# Patient Record
Sex: MALE | Race: OTHER | HISPANIC OR LATINO | Employment: OTHER | URBAN - METROPOLITAN AREA
[De-identification: names, ages, dates, MRNs, and addresses within clinical notes are randomized per-mention and may not be internally consistent; named-entity substitution may affect disease eponyms.]

---

## 2023-03-09 DIAGNOSIS — E78.2 MIXED HYPERLIPIDEMIA: Primary | ICD-10-CM

## 2023-03-09 PROBLEM — E11.9 TYPE 2 DIABETES MELLITUS (MULTI): Status: ACTIVE | Noted: 2023-03-09

## 2023-03-09 PROBLEM — I10 HYPERTENSION: Status: ACTIVE | Noted: 2023-03-09

## 2023-03-09 PROBLEM — E55.9 VITAMIN D DEFICIENCY: Status: ACTIVE | Noted: 2023-03-09

## 2023-03-09 RX ORDER — ATORVASTATIN CALCIUM 20 MG/1
1 TABLET, FILM COATED ORAL DAILY
COMMUNITY
Start: 2017-06-05 | End: 2023-03-09 | Stop reason: SDUPTHER

## 2023-03-09 RX ORDER — ATORVASTATIN CALCIUM 20 MG/1
20 TABLET, FILM COATED ORAL DAILY
Qty: 90 TABLET | Refills: 0 | Status: SHIPPED | OUTPATIENT
Start: 2023-03-09 | End: 2023-03-14 | Stop reason: SDUPTHER

## 2023-03-13 NOTE — TELEPHONE ENCOUNTER
Pt is calling states his atorvastatin was sent to wrong pharmacy, he wants refill to be re sent to [facility]'s on [address]

## 2023-03-14 RX ORDER — ATORVASTATIN CALCIUM 20 MG/1
20 TABLET, FILM COATED ORAL DAILY
Qty: 90 TABLET | Refills: 0 | Status: SHIPPED | OUTPATIENT
Start: 2023-03-14 | End: 2023-03-16 | Stop reason: SDUPTHER

## 2023-03-16 DIAGNOSIS — E78.2 MIXED HYPERLIPIDEMIA: ICD-10-CM

## 2023-03-16 RX ORDER — ATORVASTATIN CALCIUM 20 MG/1
20 TABLET, FILM COATED ORAL DAILY
Qty: 90 TABLET | Refills: 0 | Status: SHIPPED | OUTPATIENT
Start: 2023-03-16 | End: 2023-07-17 | Stop reason: SDUPTHER

## 2023-03-16 NOTE — TELEPHONE ENCOUNTER
Spoke with pharmacy and they are saying they did not get the atorvastatin script and need it resent.

## 2023-03-26 LAB
ESTIMATED AVERAGE GLUCOSE FOR HBA1C: 214 MG/DL
HEMOGLOBIN A1C/HEMOGLOBIN TOTAL IN BLOOD: 9.1 %

## 2023-04-06 ENCOUNTER — NURSING HOME VISIT (OUTPATIENT)
Dept: POST ACUTE CARE | Facility: EXTERNAL LOCATION | Age: 73
End: 2023-04-06

## 2023-04-06 ENCOUNTER — NURSING HOME VISIT (OUTPATIENT)
Dept: POST ACUTE CARE | Facility: EXTERNAL LOCATION | Age: 73
End: 2023-04-06
Payer: MEDICARE

## 2023-04-06 VITALS
OXYGEN SATURATION: 97 % | DIASTOLIC BLOOD PRESSURE: 78 MMHG | HEART RATE: 78 BPM | RESPIRATION RATE: 17 BRPM | TEMPERATURE: 97.9 F | BODY MASS INDEX: 31.17 KG/M2 | SYSTOLIC BLOOD PRESSURE: 123 MMHG | WEIGHT: 205 LBS

## 2023-04-06 VITALS
OXYGEN SATURATION: 97 % | SYSTOLIC BLOOD PRESSURE: 123 MMHG | DIASTOLIC BLOOD PRESSURE: 78 MMHG | TEMPERATURE: 97.9 F | HEART RATE: 78 BPM | BODY MASS INDEX: 31.17 KG/M2 | RESPIRATION RATE: 17 BRPM | WEIGHT: 205 LBS

## 2023-04-06 DIAGNOSIS — S98.132A AMPUTATION OF TOE OF LEFT FOOT (CMS-HCC): ICD-10-CM

## 2023-04-06 DIAGNOSIS — B95.5 STREPTOCOCCAL BACTEREMIA: ICD-10-CM

## 2023-04-06 DIAGNOSIS — M86.072 ACUTE HEMATOGENOUS OSTEOMYELITIS OF LEFT FOOT (MULTI): Primary | ICD-10-CM

## 2023-04-06 DIAGNOSIS — I10 PRIMARY HYPERTENSION: ICD-10-CM

## 2023-04-06 DIAGNOSIS — R78.81 STREPTOCOCCAL BACTEREMIA: ICD-10-CM

## 2023-04-06 DIAGNOSIS — E11.621 TYPE 2 DIABETES MELLITUS WITH FOOT ULCER, WITH LONG-TERM CURRENT USE OF INSULIN (MULTI): ICD-10-CM

## 2023-04-06 DIAGNOSIS — Z79.4 TYPE 2 DIABETES MELLITUS WITH FOOT ULCER, WITH LONG-TERM CURRENT USE OF INSULIN (MULTI): ICD-10-CM

## 2023-04-06 DIAGNOSIS — L97.509 TYPE 2 DIABETES MELLITUS WITH FOOT ULCER, WITH LONG-TERM CURRENT USE OF INSULIN (MULTI): ICD-10-CM

## 2023-04-06 DIAGNOSIS — L08.9 DIABETIC FOOT INFECTION (MULTI): ICD-10-CM

## 2023-04-06 DIAGNOSIS — E11.628 DIABETIC FOOT INFECTION (MULTI): ICD-10-CM

## 2023-04-06 PROBLEM — E78.2 MIXED HYPERLIPIDEMIA: Status: RESOLVED | Noted: 2023-03-09 | Resolved: 2023-04-06

## 2023-04-06 PROCEDURE — 99310 SBSQ NF CARE HIGH MDM 45: CPT | Performed by: NURSE PRACTITIONER

## 2023-04-06 PROCEDURE — 99306 1ST NF CARE HIGH MDM 50: CPT | Performed by: INTERNAL MEDICINE

## 2023-04-06 ASSESSMENT — ENCOUNTER SYMPTOMS
NAUSEA: 0
JOINT SWELLING: 0
DIAPHORESIS: 0
CHILLS: 0
CONSTIPATION: 0
DIARRHEA: 0
DIARRHEA: 0
SHORTNESS OF BREATH: 0
DIFFICULTY URINATING: 0
VOMITING: 0
PALPITATIONS: 0
CHILLS: 0
FATIGUE: 0
COUGH: 0
CONSTIPATION: 0
FEVER: 0
FEVER: 0
SHORTNESS OF BREATH: 0
NAUSEA: 0
COUGH: 0
ABDOMINAL PAIN: 0
MYALGIAS: 0
VOMITING: 0

## 2023-04-06 NOTE — ASSESSMENT & PLAN NOTE
On lisinopril-hydrochlorothiazide.  continue with current medical management  Continue to monitor and adjust meds based on clinical course.

## 2023-04-06 NOTE — PROGRESS NOTES
Subjective   Patient ID: Marco Antonio Gill is a 72 y.o. male who is acute skilled care being seen and evaluated for multiple medical problems.    HPI   Marco Antonio Gill is a 72 y.o. male  here for skilled care following hospitalization due to osteomyeltiis to left foot and streptococcus bacteremia.    HPI    He is s/p I&D of left foot and 2nd toe amputation.  Blood cultures grew streptococcus.  Wound culture grew streptococcus and psedumonas.  He is on IV zosyn and weekly labs ordered.    Past medical hx includes DM, HTN, CVA (no residual).   Last A1C 9.1.     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    He resides with wife in split level home.    He denies any pain.    Today the patient is resting comfortably in his chair no distress.  Per the hospital notes there was consideration for wound VAC however this has not been applied to the patient at this time.  The patient with close outpatient follow-up with his podiatrist Dr. Thrasher next week and his infectious disease doctor Dr. Marquez in 4 weeks to determine final stop date for his antibiotic Zosyn.  Reports he is required no pain medication for the last 6 days.     Labs:   3/31  WBC: 10.5  Hgb: 10.0  Hct: 31.0  Platelet: 298     Na:  139  K: 3.9  Cl: 106  Co2: 27  BUN: 23  Creatinine: 1.16  GFR: 66     3/23 CRP 27.21  A1C 9.1    Echocardiogram from March 2023:  Left ventricular ejection fraction 60 to 65% with right ventricular hypertrophy and normal right ventricular systolic pressure     MEDS:  Asa  Atorvastatin  Glimeperide  Glargine  Lispro ss coverage and routine premeal  Lisinopril-hydrochlorothiazide  Zosyn fro 4-6 weeks, tentative stop date 4/24  Vitamin D  Melatonin    Review of Systems   Constitutional:  Negative for chills, diaphoresis and fever.   Respiratory:  Negative for cough and shortness of breath.    Cardiovascular:  Negative for chest pain and leg swelling.   Gastrointestinal:  Negative for  constipation, diarrhea, nausea and vomiting.   Musculoskeletal:  Negative for joint swelling and myalgias.       Objective   /78   Pulse 78   Temp 36.6 °C (97.9 °F)   Resp 17   Wt 93 kg (205 lb)   SpO2 97%   BMI 31.17 kg/m²     Physical Exam  Vitals reviewed.   Constitutional:       General: He is not in acute distress.     Appearance: He is obese. He is not ill-appearing, toxic-appearing or diaphoretic.   Eyes:      Conjunctiva/sclera: Conjunctivae normal.      Pupils: Pupils are equal, round, and reactive to light.   Neck:      Vascular: No carotid bruit.   Cardiovascular:      Rate and Rhythm: Normal rate and regular rhythm.      Pulses: Normal pulses.      Heart sounds: Normal heart sounds.      No gallop.   Pulmonary:      Breath sounds: Normal breath sounds. No wheezing, rhonchi or rales.   Abdominal:      General: Abdomen is flat. Bowel sounds are normal.      Palpations: Abdomen is soft.      Tenderness: There is no guarding or rebound.   Musculoskeletal:      Cervical back: Normal range of motion and neck supple.      Comments: The patient's left foot is dressed and dry.   Skin:     General: Skin is warm and dry.      Findings: No lesion or rash.   Neurological:      General: No focal deficit present.      Mental Status: He is oriented to person, place, and time. Mental status is at baseline.   Psychiatric:         Mood and Affect: Mood normal.         Behavior: Behavior normal.         Assessment/Plan   Problem List Items Addressed This Visit          Circulatory    Hypertension       Musculoskeletal    Acute hematogenous osteomyelitis of left foot (CMS/HCC) - Primary    Amputation of toe of left foot (CMS/HCC)    Diabetic foot infection (CMS/HCC)       Endocrine/Metabolic    Type 2 diabetes mellitus (CMS/HCC)       Other    Streptococcal bacteremia     A.  We will continue with rehabilitative restorative and supportive care as patient tolerates.    B.  The patient will be followed closely here  by wound care for dressing changes.    C.  The patient will have ongoing parenteral Zosyn therapy for osteomyelitis of the foot.  He will have routine laboratory examinations drawn weekly while taking the parenteral antibiotic.    D.  The patient is scheduled for follow-up with his podiatrist next week.    E.  The patient will have outpatient follow-up with his infectious disease doctor in 4 weeks to determine the final stop date for the antibiotic therapy.    F.  Disposition will be determined pending response to rehabilitation and overall assessment of patient safety awareness however I suspect this patient will be discharged home after antibiotics are completed.    G.  The patient's prognosis is fair-2-guarded.

## 2023-04-06 NOTE — ASSESSMENT & PLAN NOTE
Hgb A1C 9.1.  on glimeperide, humalog ss coverage and premeal and lantus.  Blood sugars 140-279 since admission.  Continue to monitor and adjust meds based on clinical course.

## 2023-04-06 NOTE — LETTER
Patient: Marco Antonio Gill  : 1950    Encounter Date: 2023    Subjective   Marco Antonio Gill is a 72 y.o. male  here for skilled care following hospitalization due to osteomyeltiis to left foot and streptococcus bacteremia.    HPI    He is s/p I&D of left foot and 2nd toe amputation.  Blood cultures grew streptococcus.  Wound culture grew streptococcus and psedumonas.  He is on IV zosyn and weekly labs ordered.    Past medical hx includes DM, HTN, CVA (no residual).   Last A1C 9.1.     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    He resides with wife in split level home.    He denies any pain.      Labs:   3/31  WBC: 10.5  Hgb: 10.0  Hct: 31.0  Platelet: 298    Na:  139  K: 3.9  Cl: 106  Co2: 27  BUN: 23  Creatinine: 1.16  GFR: 66    3/23 CRP 27.21  A1C 9.1    MEDS:  Asa  Atorvastatin  Glimeperide  Glargine  Lispro ss coverage and routine premeal  Lisinopril-hydrochlorothiazide  Zosyn fro 4-6 weeks, tentative stop date   Vitamin D  melatonin    Review of Systems   Constitutional:  Negative for chills, fatigue and fever.   Respiratory:  Negative for cough and shortness of breath.    Cardiovascular:  Negative for chest pain and palpitations.   Gastrointestinal:  Negative for abdominal pain, constipation, diarrhea, nausea and vomiting.   Genitourinary:  Negative for difficulty urinating.       Objective   /78   Pulse 78   Temp 36.6 °C (97.9 °F)   Resp 17   Wt 93 kg (205 lb)   SpO2 97%   BMI 31.17 kg/m²     Physical Exam  Constitutional:       General: He is not in acute distress.  HENT:      Head: Normocephalic and atraumatic.   Eyes:      Conjunctiva/sclera: Conjunctivae normal.   Cardiovascular:      Rate and Rhythm: Normal rate and regular rhythm.   Pulmonary:      Effort: Pulmonary effort is normal. No respiratory distress.      Breath sounds: Normal breath sounds.   Abdominal:      General: Bowel sounds are normal. There is no distension.       Palpations: Abdomen is soft.      Tenderness: There is no abdominal tenderness.   Musculoskeletal:         General: Normal range of motion.      Right lower leg: No edema.      Left lower leg: No edema.      Comments: Dressing and surgical sho3 to left foot, intact.   PICC to LUE intact    Skin:     General: Skin is warm and dry.   Neurological:      General: No focal deficit present.      Mental Status: He is alert and oriented to person, place, and time.   Psychiatric:         Mood and Affect: Mood normal.         Behavior: Behavior normal.         Assessment/Plan   Problem List Items Addressed This Visit          Circulatory    Hypertension     On lisinopril-hydrochlorothiazide.  continue with current medical management  Continue to monitor and adjust meds based on clinical course.              Musculoskeletal    Acute hematogenous osteomyelitis of left foot (CMS/[facility]) - Primary     S/p left 2nd toe amputation, I&D 3/27.    Follow upw with Dr Thrasher in wound clinic.  Zosyn through 4/24.  Weekly labs to ID.              Endocrine/Metabolic    Type 2 diabetes mellitus (CMS/[facility])     Hgb A1C 9.1.  on glimeperide, humalog ss coverage and premeal and lantus.  Blood sugars 140-279 since admission.  Continue to monitor and adjust meds based on clinical course.              Other    Streptococcal bacteremia     Zosyn through 4/24.   Weekly labs ordered and to be faxed to ID.          labs/meds/orders reviewed  staff to monitor and notify for any changes.  Continue with iv atb, weekly labs to ID.   Hospital records reviewed.   Time for coordination of care was greater than 35 minutes with hospital chart review, visit and exam, discussion of treatment plan with patient and also discussion of case with staff.      Electronically Signed By: BLU Martínez   4/6/23  2:26 PM

## 2023-04-06 NOTE — LETTER
Patient: Marco Antonio Gill  : 1950    Encounter Date: 2023    Subjective  Patient ID: Marco Antonio Gill is a 72 y.o. male who is acute skilled care being seen and evaluated for multiple medical problems.    HPI   Marco Antonio Gill is a 72 y.o. male  here for skilled care following hospitalization due to osteomyeltiis to left foot and streptococcus bacteremia.    HPI    He is s/p I&D of left foot and 2nd toe amputation.  Blood cultures grew streptococcus.  Wound culture grew streptococcus and psedumonas.  He is on IV zosyn and weekly labs ordered.    Past medical hx includes DM, HTN, CVA (no residual).   Last A1C 9.1.     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    He resides with wife in split level home.    He denies any pain.    Today the patient is resting comfortably in his chair no distress.  Per the hospital notes there was consideration for wound VAC however this has not been applied to the patient at this time.  The patient with close outpatient follow-up with his podiatrist Dr. Thrasher next week and his infectious disease doctor Dr. Marquez in 4 weeks to determine final stop date for his antibiotic Zosyn.  Reports he is required no pain medication for the last 6 days.     Labs:   3/31  WBC: 10.5  Hgb: 10.0  Hct: 31.0  Platelet: 298     Na:  139  K: 3.9  Cl: 106  Co2: 27  BUN: 23  Creatinine: 1.16  GFR: 66     3/23 CRP 27.21  A1C 9.1    Echocardiogram from 2023:  Left ventricular ejection fraction 60 to 65% with right ventricular hypertrophy and normal right ventricular systolic pressure     MEDS:  Asa  Atorvastatin  Glimeperide  Glargine  Lispro ss coverage and routine premeal  Lisinopril-hydrochlorothiazide  Zosyn fro 4-6 weeks, tentative stop date   Vitamin D  Melatonin    Review of Systems   Constitutional:  Negative for chills, diaphoresis and fever.   Respiratory:  Negative for cough and shortness of breath.    Cardiovascular:  Negative for  chest pain and leg swelling.   Gastrointestinal:  Negative for constipation, diarrhea, nausea and vomiting.   Musculoskeletal:  Negative for joint swelling and myalgias.       Objective  /78   Pulse 78   Temp 36.6 °C (97.9 °F)   Resp 17   Wt 93 kg (205 lb)   SpO2 97%   BMI 31.17 kg/m²     Physical Exam  Vitals reviewed.   Constitutional:       General: He is not in acute distress.     Appearance: He is obese. He is not ill-appearing, toxic-appearing or diaphoretic.   Eyes:      Conjunctiva/sclera: Conjunctivae normal.      Pupils: Pupils are equal, round, and reactive to light.   Neck:      Vascular: No carotid bruit.   Cardiovascular:      Rate and Rhythm: Normal rate and regular rhythm.      Pulses: Normal pulses.      Heart sounds: Normal heart sounds.      No gallop.   Pulmonary:      Breath sounds: Normal breath sounds. No wheezing, rhonchi or rales.   Abdominal:      General: Abdomen is flat. Bowel sounds are normal.      Palpations: Abdomen is soft.      Tenderness: There is no guarding or rebound.   Musculoskeletal:      Cervical back: Normal range of motion and neck supple.      Comments: The patient's left foot is dressed and dry.   Skin:     General: Skin is warm and dry.      Findings: No lesion or rash.   Neurological:      General: No focal deficit present.      Mental Status: He is oriented to person, place, and time. Mental status is at baseline.   Psychiatric:         Mood and Affect: Mood normal.         Behavior: Behavior normal.         Assessment/Plan  Problem List Items Addressed This Visit          Circulatory    Hypertension       Musculoskeletal    Acute hematogenous osteomyelitis of left foot (CMS/HCC) - Primary    Amputation of toe of left foot (CMS/HCC)    Diabetic foot infection (CMS/HCC)       Endocrine/Metabolic    Type 2 diabetes mellitus (CMS/HCC)       Other    Streptococcal bacteremia     A.  We will continue with rehabilitative restorative and supportive care as  patient tolerates.    B.  The patient will be followed closely here by wound care for dressing changes.    C.  The patient will have ongoing parenteral Zosyn therapy for osteomyelitis of the foot.  He will have routine laboratory examinations drawn weekly while taking the parenteral antibiotic.    D.  The patient is scheduled for follow-up with his podiatrist next week.    E.  The patient will have outpatient follow-up with his infectious disease doctor in 4 weeks to determine the final stop date for the antibiotic therapy.    F.  Disposition will be determined pending response to rehabilitation and overall assessment of patient safety awareness however I suspect this patient will be discharged home after antibiotics are completed.    G.  The patient's prognosis is fair-2-guarded.        Electronically Signed By: David Caceres MD   4/6/23  6:01 PM

## 2023-04-06 NOTE — PROGRESS NOTES
Subjective   Marco Antonio Gill is a 72 y.o. male  here for skilled care following hospitalization due to osteomyeltiis to left foot and streptococcus bacteremia.    HPI    He is s/p I&D of left foot and 2nd toe amputation.  Blood cultures grew streptococcus.  Wound culture grew streptococcus and psedumonas.  He is on IV zosyn and weekly labs ordered.    Past medical hx includes DM, HTN, CVA (no residual).   Last A1C 9.1.     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    He resides with wife in split level home.    He denies any pain.      Labs:   3/31  WBC: 10.5  Hgb: 10.0  Hct: 31.0  Platelet: 298    Na:  139  K: 3.9  Cl: 106  Co2: 27  BUN: 23  Creatinine: 1.16  GFR: 66    3/23 CRP 27.21  A1C 9.1    MEDS:  Asa  Atorvastatin  Glimeperide  Glargine  Lispro ss coverage and routine premeal  Lisinopril-hydrochlorothiazide  Zosyn fro 4-6 weeks, tentative stop date 4/24  Vitamin D  melatonin    Review of Systems   Constitutional:  Negative for chills, fatigue and fever.   Respiratory:  Negative for cough and shortness of breath.    Cardiovascular:  Negative for chest pain and palpitations.   Gastrointestinal:  Negative for abdominal pain, constipation, diarrhea, nausea and vomiting.   Genitourinary:  Negative for difficulty urinating.       Objective   /78   Pulse 78   Temp 36.6 °C (97.9 °F)   Resp 17   Wt 93 kg (205 lb)   SpO2 97%   BMI 31.17 kg/m²     Physical Exam  Constitutional:       General: He is not in acute distress.  HENT:      Head: Normocephalic and atraumatic.   Eyes:      Conjunctiva/sclera: Conjunctivae normal.   Cardiovascular:      Rate and Rhythm: Normal rate and regular rhythm.   Pulmonary:      Effort: Pulmonary effort is normal. No respiratory distress.      Breath sounds: Normal breath sounds.   Abdominal:      General: Bowel sounds are normal. There is no distension.      Palpations: Abdomen is soft.      Tenderness: There is no abdominal  tenderness.   Musculoskeletal:         General: Normal range of motion.      Right lower leg: No edema.      Left lower leg: No edema.      Comments: Dressing and surgical sho3 to left foot, intact.   PICC to LUE intact    Skin:     General: Skin is warm and dry.   Neurological:      General: No focal deficit present.      Mental Status: He is alert and oriented to person, place, and time.   Psychiatric:         Mood and Affect: Mood normal.         Behavior: Behavior normal.         Assessment/Plan   Problem List Items Addressed This Visit          Circulatory    Hypertension     On lisinopril-hydrochlorothiazide.  continue with current medical management  Continue to monitor and adjust meds based on clinical course.              Musculoskeletal    Acute hematogenous osteomyelitis of left foot (CMS/[facility]) - Primary     S/p left 2nd toe amputation, I&D 3/27.    Follow upw with Dr Thrasher in wound clinic.  Zosyn through 4/24.  Weekly labs to ID.              Endocrine/Metabolic    Type 2 diabetes mellitus (CMS/[facility])     Hgb A1C 9.1.  on glimeperide, humalog ss coverage and premeal and lantus.  Blood sugars 140-279 since admission.  Continue to monitor and adjust meds based on clinical course.              Other    Streptococcal bacteremia     Zosyn through 4/24.   Weekly labs ordered and to be faxed to ID.          labs/meds/orders reviewed  staff to monitor and notify for any changes.  Continue with iv atb, weekly labs to ID.   Hospital records reviewed.   Time for coordination of care was greater than 35 minutes with hospital chart review, visit and exam, discussion of treatment plan with patient and also discussion of case with staff.

## 2023-04-06 NOTE — ASSESSMENT & PLAN NOTE
S/p left 2nd toe amputation, I&D 3/27.    Follow upw with Dr Thrasher in wound clinic.  Zosyn through 4/24.  Weekly labs to ID.

## 2023-04-11 PROBLEM — E66.811 CLASS 1 OBESITY WITH BODY MASS INDEX (BMI) OF 32.0 TO 32.9 IN ADULT: Status: ACTIVE | Noted: 2023-04-11

## 2023-04-11 PROBLEM — E66.9 CLASS 1 OBESITY WITH BODY MASS INDEX (BMI) OF 32.0 TO 32.9 IN ADULT: Status: ACTIVE | Noted: 2023-04-11

## 2023-04-11 PROBLEM — R01.1 HEART MURMUR: Status: ACTIVE | Noted: 2023-04-11

## 2023-04-11 PROBLEM — R80.9 MICROALBUMINURIA: Status: ACTIVE | Noted: 2023-04-11

## 2023-04-11 PROBLEM — R94.31 ABNORMAL EKG: Status: ACTIVE | Noted: 2023-04-11

## 2023-04-11 PROBLEM — R03.0 BLOOD PRESSURE ELEVATED WITHOUT HISTORY OF HTN: Status: ACTIVE | Noted: 2023-04-11

## 2023-04-11 RX ORDER — INSULIN GLARGINE 100 [IU]/ML
62 INJECTION, SOLUTION SUBCUTANEOUS EVERY 24 HOURS
COMMUNITY
Start: 2023-03-30 | End: 2023-05-02 | Stop reason: DRUGHIGH

## 2023-04-11 RX ORDER — BLOOD-GLUCOSE METER
EACH MISCELLANEOUS 3 TIMES DAILY
COMMUNITY
Start: 2019-03-27 | End: 2023-12-14 | Stop reason: SDUPTHER

## 2023-04-11 RX ORDER — GLIMEPIRIDE 4 MG/1
2 TABLET ORAL
COMMUNITY
Start: 2015-01-15 | End: 2023-06-12 | Stop reason: SDUPTHER

## 2023-04-11 RX ORDER — PEN NEEDLE, DIABETIC 30 GX3/16"
NEEDLE, DISPOSABLE MISCELLANEOUS
COMMUNITY
End: 2024-05-23 | Stop reason: SDUPTHER

## 2023-04-11 RX ORDER — CHOLECALCIFEROL (VITAMIN D3) 50 MCG
12 TABLET ORAL DAILY
COMMUNITY
Start: 2021-08-24 | End: 2024-01-31 | Stop reason: DRUGHIGH

## 2023-04-11 RX ORDER — ASPIRIN 81 MG/1
1 TABLET ORAL DAILY
COMMUNITY
Start: 2016-09-19

## 2023-04-11 RX ORDER — LISINOPRIL AND HYDROCHLOROTHIAZIDE 12.5; 2 MG/1; MG/1
2 TABLET ORAL DAILY
COMMUNITY
Start: 2017-12-19 | End: 2023-05-15 | Stop reason: SDUPTHER

## 2023-04-11 RX ORDER — IBUPROFEN 200 MG
CAPSULE ORAL 3 TIMES DAILY
COMMUNITY
Start: 2017-12-19 | End: 2023-12-14 | Stop reason: SDUPTHER

## 2023-04-11 RX ORDER — INSULIN LISPRO 100 [IU]/ML
INJECTION, SOLUTION INTRAVENOUS; SUBCUTANEOUS
COMMUNITY
Start: 2018-03-26 | End: 2023-05-15 | Stop reason: SDUPTHER

## 2023-04-11 RX ORDER — LANCETS
EACH MISCELLANEOUS DAILY
COMMUNITY
Start: 2018-10-31

## 2023-04-13 ENCOUNTER — NURSING HOME VISIT (OUTPATIENT)
Dept: POST ACUTE CARE | Facility: EXTERNAL LOCATION | Age: 73
End: 2023-04-13
Payer: MEDICARE

## 2023-04-13 VITALS
BODY MASS INDEX: 31.17 KG/M2 | TEMPERATURE: 97.7 F | SYSTOLIC BLOOD PRESSURE: 122 MMHG | WEIGHT: 205 LBS | RESPIRATION RATE: 18 BRPM | DIASTOLIC BLOOD PRESSURE: 63 MMHG | HEART RATE: 67 BPM | OXYGEN SATURATION: 97 %

## 2023-04-13 DIAGNOSIS — E11.621 TYPE 2 DIABETES MELLITUS WITH FOOT ULCER, WITH LONG-TERM CURRENT USE OF INSULIN (MULTI): ICD-10-CM

## 2023-04-13 DIAGNOSIS — Z79.4 TYPE 2 DIABETES MELLITUS WITH FOOT ULCER, WITH LONG-TERM CURRENT USE OF INSULIN (MULTI): ICD-10-CM

## 2023-04-13 DIAGNOSIS — R78.81 STREPTOCOCCAL BACTEREMIA: ICD-10-CM

## 2023-04-13 DIAGNOSIS — I10 PRIMARY HYPERTENSION: ICD-10-CM

## 2023-04-13 DIAGNOSIS — L97.509 TYPE 2 DIABETES MELLITUS WITH FOOT ULCER, WITH LONG-TERM CURRENT USE OF INSULIN (MULTI): ICD-10-CM

## 2023-04-13 DIAGNOSIS — L08.9 DIABETIC FOOT INFECTION (MULTI): ICD-10-CM

## 2023-04-13 DIAGNOSIS — M86.072 ACUTE HEMATOGENOUS OSTEOMYELITIS OF LEFT FOOT (MULTI): Primary | ICD-10-CM

## 2023-04-13 DIAGNOSIS — E11.628 DIABETIC FOOT INFECTION (MULTI): ICD-10-CM

## 2023-04-13 DIAGNOSIS — B95.5 STREPTOCOCCAL BACTEREMIA: ICD-10-CM

## 2023-04-13 PROCEDURE — 99309 SBSQ NF CARE MODERATE MDM 30: CPT | Performed by: NURSE PRACTITIONER

## 2023-04-13 ASSESSMENT — ENCOUNTER SYMPTOMS
CONSTIPATION: 0
SHORTNESS OF BREATH: 0
NAUSEA: 0
DIFFICULTY URINATING: 0
COUGH: 0
ABDOMINAL PAIN: 0
DIARRHEA: 0
VOMITING: 0
FATIGUE: 0
PALPITATIONS: 0
FEVER: 0
CHILLS: 0

## 2023-04-13 NOTE — LETTER
Patient: Marco Antonio Gill  : 1950    Encounter Date: 2023    Subjective  Marco Antonio Gill is a 72 y.o. male Here for weekly skilled visit.  HPI   Health problems reviewed and no acute concerns.  Participating in therapy and feeling stronger.  Eating and drinking well.  he denies any complaints of pain.  No concerns per staff.   Tolerating IV antibiotics without apparent side effects weekly labs faxed to ID.  He is following up with podiatry.           Review of Systems   Constitutional:  Negative for chills, fatigue and fever.   Respiratory:  Negative for cough and shortness of breath.    Cardiovascular:  Negative for chest pain and palpitations.   Gastrointestinal:  Negative for abdominal pain, constipation, diarrhea, nausea and vomiting.   Genitourinary:  Negative for difficulty urinating.       Objective  /63   Pulse 67   Temp 36.5 °C (97.7 °F)   Resp 18   Wt 93 kg (205 lb)   SpO2 97%   BMI 31.17 kg/m²     Physical Exam  Constitutional:       General: He is not in acute distress.  HENT:      Head: Normocephalic and atraumatic.   Eyes:      Conjunctiva/sclera: Conjunctivae normal.   Cardiovascular:      Rate and Rhythm: Normal rate and regular rhythm.   Pulmonary:      Effort: Pulmonary effort is normal. No respiratory distress.      Breath sounds: Normal breath sounds.   Abdominal:      General: Bowel sounds are normal. There is no distension.      Palpations: Abdomen is soft.      Tenderness: There is no abdominal tenderness.   Musculoskeletal:         General: Normal range of motion.      Right lower leg: No edema.      Left lower leg: No edema.      Comments: Dressing and surgical shoe to left foot, intact.   PICC to LUE intact    Skin:     General: Skin is warm and dry.   Neurological:      General: No focal deficit present.      Mental Status: He is alert and oriented to person, place, and time.   Psychiatric:         Mood and Affect: Mood normal.         Behavior: Behavior normal.          Assessment/Plan  Problem List Items Addressed This Visit          Circulatory    Hypertension     On lisinopril-hydrochlorothiazide.  continue with current medical management  Continue to monitor and adjust meds based on clinical course.              Musculoskeletal    Acute hematogenous osteomyelitis of left foot (CMS/[facility]) - Primary     S/p left 2nd toe amputation, I&D 3/27.    Follow up w with Dr Thrasher in wound clinic.  Zosyn through 4/24.  Weekly labs to ID.           Diabetic foot infection (CMS/[facility])     Follow up with podiatry as scheduled.             Endocrine/Metabolic    Type 2 diabetes mellitus (CMS/[facility])     Hgb A1C 9.1.  on glimeperide, humalog ss coverage and premeal and lantus.  Blood sugars 100-300's  since admission.  Continue to monitor and adjust meds based on clinical course.              Other    Streptococcal bacteremia     Zosyn through 4/24.   Weekly labs ordered and to be faxed to ID.          labs/meds/orders reviewed  staff to monitor and notify for any changes.  continue with therapy as able.  Follow up with ID and podiatry as scheduled.       Electronically Signed By: BLU Martínez   4/13/23  3:46 PM

## 2023-04-13 NOTE — ASSESSMENT & PLAN NOTE
Hgb A1C 9.1.  on glimeperide, humalog ss coverage and premeal and lantus.  Blood sugars 100-300's  since admission.  Continue to monitor and adjust meds based on clinical course.

## 2023-04-13 NOTE — PROGRESS NOTES
Subjective   Marco Antonio Gill is a 72 y.o. male Here for weekly skilled visit.  HPI   Health problems reviewed and no acute concerns.  Participating in therapy and feeling stronger.  Eating and drinking well.  he denies any complaints of pain.  No concerns per staff.   Tolerating IV antibiotics without apparent side effects weekly labs faxed to ID.  He is following up with podiatry.           Review of Systems   Constitutional:  Negative for chills, fatigue and fever.   Respiratory:  Negative for cough and shortness of breath.    Cardiovascular:  Negative for chest pain and palpitations.   Gastrointestinal:  Negative for abdominal pain, constipation, diarrhea, nausea and vomiting.   Genitourinary:  Negative for difficulty urinating.       Objective   /63   Pulse 67   Temp 36.5 °C (97.7 °F)   Resp 18   Wt 93 kg (205 lb)   SpO2 97%   BMI 31.17 kg/m²     Physical Exam  Constitutional:       General: He is not in acute distress.  HENT:      Head: Normocephalic and atraumatic.   Eyes:      Conjunctiva/sclera: Conjunctivae normal.   Cardiovascular:      Rate and Rhythm: Normal rate and regular rhythm.   Pulmonary:      Effort: Pulmonary effort is normal. No respiratory distress.      Breath sounds: Normal breath sounds.   Abdominal:      General: Bowel sounds are normal. There is no distension.      Palpations: Abdomen is soft.      Tenderness: There is no abdominal tenderness.   Musculoskeletal:         General: Normal range of motion.      Right lower leg: No edema.      Left lower leg: No edema.      Comments: Dressing and surgical shoe to left foot, intact.   PICC to LUE intact    Skin:     General: Skin is warm and dry.   Neurological:      General: No focal deficit present.      Mental Status: He is alert and oriented to person, place, and time.   Psychiatric:         Mood and Affect: Mood normal.         Behavior: Behavior normal.         Assessment/Plan   Problem List Items Addressed This Visit           Circulatory    Hypertension     On lisinopril-hydrochlorothiazide.  continue with current medical management  Continue to monitor and adjust meds based on clinical course.              Musculoskeletal    Acute hematogenous osteomyelitis of left foot (CMS/[facility]) - Primary     S/p left 2nd toe amputation, I&D 3/27.    Follow up w with Dr Thrasher in wound clinic.  Zosyn through 4/24.  Weekly labs to ID.           Diabetic foot infection (CMS/[facility])     Follow up with podiatry as scheduled.             Endocrine/Metabolic    Type 2 diabetes mellitus (CMS/[facility])     Hgb A1C 9.1.  on glimeperide, humalog ss coverage and premeal and lantus.  Blood sugars 100-300's  since admission.  Continue to monitor and adjust meds based on clinical course.              Other    Streptococcal bacteremia     Zosyn through 4/24.   Weekly labs ordered and to be faxed to ID.          labs/meds/orders reviewed  staff to monitor and notify for any changes.  continue with therapy as able.  Follow up with ID and podiatry as scheduled.

## 2023-04-13 NOTE — ASSESSMENT & PLAN NOTE
S/p left 2nd toe amputation, I&D 3/27.    Follow up w with Dr Thrasher in wound clinic.  Zosyn through 4/24.  Weekly labs to ID.

## 2023-04-17 ENCOUNTER — APPOINTMENT (OUTPATIENT)
Dept: PRIMARY CARE | Facility: CLINIC | Age: 73
End: 2023-04-17
Payer: MEDICARE

## 2023-04-17 DIAGNOSIS — M86.072 ACUTE HEMATOGENOUS OSTEOMYELITIS OF LEFT FOOT (MULTI): Primary | ICD-10-CM

## 2023-04-17 LAB
ANION GAP IN SER/PLAS: 11 MMOL/L (ref 10–20)
CALCIUM (MG/DL) IN SER/PLAS: 8.8 MG/DL (ref 8.6–10.3)
CARBON DIOXIDE, TOTAL (MMOL/L) IN SER/PLAS: 28 MMOL/L (ref 21–32)
CHLORIDE (MMOL/L) IN SER/PLAS: 103 MMOL/L (ref 98–107)
CREATININE (MG/DL) IN SER/PLAS: 1.17 MG/DL (ref 0.5–1.3)
GFR MALE: 65 ML/MIN/1.73M2
GLUCOSE (MG/DL) IN SER/PLAS: 205 MG/DL (ref 74–99)
POTASSIUM (MMOL/L) IN SER/PLAS: 3.9 MMOL/L (ref 3.5–5.3)
SODIUM (MMOL/L) IN SER/PLAS: 138 MMOL/L (ref 136–145)
UREA NITROGEN (MG/DL) IN SER/PLAS: 21 MG/DL (ref 6–23)

## 2023-04-20 ENCOUNTER — NURSING HOME VISIT (OUTPATIENT)
Dept: POST ACUTE CARE | Facility: EXTERNAL LOCATION | Age: 73
End: 2023-04-20
Payer: MEDICARE

## 2023-04-20 VITALS
HEART RATE: 60 BPM | WEIGHT: 205 LBS | TEMPERATURE: 97.9 F | OXYGEN SATURATION: 97 % | RESPIRATION RATE: 18 BRPM | DIASTOLIC BLOOD PRESSURE: 74 MMHG | BODY MASS INDEX: 31.17 KG/M2 | SYSTOLIC BLOOD PRESSURE: 148 MMHG

## 2023-04-20 DIAGNOSIS — L08.9 DIABETIC FOOT INFECTION (MULTI): ICD-10-CM

## 2023-04-20 DIAGNOSIS — L97.509 TYPE 2 DIABETES MELLITUS WITH FOOT ULCER, WITH LONG-TERM CURRENT USE OF INSULIN (MULTI): ICD-10-CM

## 2023-04-20 DIAGNOSIS — I10 PRIMARY HYPERTENSION: ICD-10-CM

## 2023-04-20 DIAGNOSIS — M86.072 ACUTE HEMATOGENOUS OSTEOMYELITIS OF LEFT FOOT (MULTI): Primary | ICD-10-CM

## 2023-04-20 DIAGNOSIS — R78.81 STREPTOCOCCAL BACTEREMIA: ICD-10-CM

## 2023-04-20 DIAGNOSIS — E11.628 DIABETIC FOOT INFECTION (MULTI): ICD-10-CM

## 2023-04-20 DIAGNOSIS — B95.5 STREPTOCOCCAL BACTEREMIA: ICD-10-CM

## 2023-04-20 DIAGNOSIS — Z79.4 TYPE 2 DIABETES MELLITUS WITH FOOT ULCER, WITH LONG-TERM CURRENT USE OF INSULIN (MULTI): ICD-10-CM

## 2023-04-20 DIAGNOSIS — E11.621 TYPE 2 DIABETES MELLITUS WITH FOOT ULCER, WITH LONG-TERM CURRENT USE OF INSULIN (MULTI): ICD-10-CM

## 2023-04-20 PROCEDURE — 99309 SBSQ NF CARE MODERATE MDM 30: CPT | Performed by: NURSE PRACTITIONER

## 2023-04-20 ASSESSMENT — ENCOUNTER SYMPTOMS
SHORTNESS OF BREATH: 0
COUGH: 0
FATIGUE: 0
VOMITING: 0
DIFFICULTY URINATING: 0
CONSTIPATION: 0
FEVER: 0
DIARRHEA: 0
PALPITATIONS: 0
NAUSEA: 0
ABDOMINAL PAIN: 0
CHILLS: 0

## 2023-04-20 NOTE — LETTER
Patient: Marco Antonio Gill  : 1950    Encounter Date: 2023    Subjective  Marco Antonio Gill is a 72 y.o. male Here for weekly skilled visit.  HPI   Health problems reviewed and no acute concerns.  Participating in therapy and feeling stronger.  Eating and drinking well.  he denies any complaints of pain.  No concerns per staff.   He started developing a rash earlier this week and ID was contacted, zosyn was stopped and started meropenem.  weekly labs faxed to ID.  He is following up with podiatry.    Wound vac was placed this week.  He has a surgical shoe in place.       Review of Systems   Constitutional:  Negative for chills, fatigue and fever.   Respiratory:  Negative for cough and shortness of breath.    Cardiovascular:  Negative for chest pain and palpitations.   Gastrointestinal:  Negative for abdominal pain, constipation, diarrhea, nausea and vomiting.   Genitourinary:  Negative for difficulty urinating.       Objective  /74   Pulse 60   Temp 36.6 °C (97.9 °F)   Resp 18   Wt 93 kg (205 lb)   SpO2 97%   BMI 31.17 kg/m²     Physical Exam  Constitutional:       General: He is not in acute distress.  HENT:      Head: Normocephalic and atraumatic.   Eyes:      Conjunctiva/sclera: Conjunctivae normal.   Cardiovascular:      Rate and Rhythm: Normal rate and regular rhythm.   Pulmonary:      Effort: Pulmonary effort is normal. No respiratory distress.      Breath sounds: Normal breath sounds.   Abdominal:      General: Bowel sounds are normal. There is no distension.      Palpations: Abdomen is soft.      Tenderness: There is no abdominal tenderness.   Musculoskeletal:         General: Normal range of motion.      Right lower leg: No edema.      Left lower leg: No edema.      Comments: Dressing, wound vac  and surgical shoe to left foot, intact.   PICC to LUE intact    Skin:     General: Skin is warm and dry.   Neurological:      General: No focal deficit present.      Mental Status: He is alert  and oriented to person, place, and time.   Psychiatric:         Mood and Affect: Mood normal.         Behavior: Behavior normal.         Assessment/Plan  Problem List Items Addressed This Visit          Circulatory    Hypertension     On lisinopril-hydrochlorothiazide.  continue with current medical management  Continue to monitor and adjust meds based on clinical course.              Musculoskeletal    Acute hematogenous osteomyelitis of left foot (CMS/[facility]) - Primary     S/p left 2nd toe amputation, I&D 3/27.    Follow up w with Dr Thrasher in wound clinic.  Zosyn was stopped due to a rash that has resolved, switched to meropenem through ID.  Weekly labs to ID.           Diabetic foot infection (CMS/[facility])     Follow up with podiatry as scheduled.   Seeing Dr Thrasher next week.   He has been observed not maintaining weight bearing restrictions, this was clarified with podiatrist and he is to be 50% weight bearing.             Endocrine/Metabolic    Type 2 diabetes mellitus (CMS/[facility])     Hgb A1C 9.1.  on glimeperide, humalog ss coverage and premeal and lantus.  Blood sugars 's  since admission.  Continue to monitor and adjust meds based on clinical course.              Other    Streptococcal bacteremia     Zosyn switched to meropenem through 4/24 due to rash that has resolved.   Weekly labs ordered and to be faxed to ID.          labs/meds/orders reviewed  staff to monitor and notify for any changes.  continue with therapy as able.  Follow up with ID and podiatry as scheduled.   Weekly labs to ID.       Electronically Signed By: BLU Martínez   4/20/23  2:54 PM

## 2023-04-20 NOTE — ASSESSMENT & PLAN NOTE
Follow up with podiatry as scheduled.   Seeing Dr Thrasher next week.   He has been observed not maintaining weight bearing restrictions, this was clarified with podiatrist and he is to be 50% weight bearing.

## 2023-04-20 NOTE — ASSESSMENT & PLAN NOTE
Zosyn switched to meropenem through 4/24 due to rash that has resolved.   Weekly labs ordered and to be faxed to ID.

## 2023-04-20 NOTE — PROGRESS NOTES
Subjective   Marco Antonio Gill is a 72 y.o. male Here for weekly skilled visit.  HPI   Health problems reviewed and no acute concerns.  Participating in therapy and feeling stronger.  Eating and drinking well.  he denies any complaints of pain.  No concerns per staff.   He started developing a rash earlier this week and ID was contacted, zosyn was stopped and started meropenem.  weekly labs faxed to ID.  He is following up with podiatry.    Wound vac was placed this week.  He has a surgical shoe in place.       Review of Systems   Constitutional:  Negative for chills, fatigue and fever.   Respiratory:  Negative for cough and shortness of breath.    Cardiovascular:  Negative for chest pain and palpitations.   Gastrointestinal:  Negative for abdominal pain, constipation, diarrhea, nausea and vomiting.   Genitourinary:  Negative for difficulty urinating.       Objective   /74   Pulse 60   Temp 36.6 °C (97.9 °F)   Resp 18   Wt 93 kg (205 lb)   SpO2 97%   BMI 31.17 kg/m²     Physical Exam  Constitutional:       General: He is not in acute distress.  HENT:      Head: Normocephalic and atraumatic.   Eyes:      Conjunctiva/sclera: Conjunctivae normal.   Cardiovascular:      Rate and Rhythm: Normal rate and regular rhythm.   Pulmonary:      Effort: Pulmonary effort is normal. No respiratory distress.      Breath sounds: Normal breath sounds.   Abdominal:      General: Bowel sounds are normal. There is no distension.      Palpations: Abdomen is soft.      Tenderness: There is no abdominal tenderness.   Musculoskeletal:         General: Normal range of motion.      Right lower leg: No edema.      Left lower leg: No edema.      Comments: Dressing, wound vac  and surgical shoe to left foot, intact.   PICC to LUE intact    Skin:     General: Skin is warm and dry.   Neurological:      General: No focal deficit present.      Mental Status: He is alert and oriented to person, place, and time.   Psychiatric:         Mood and  Affect: Mood normal.         Behavior: Behavior normal.         Assessment/Plan   Problem List Items Addressed This Visit          Circulatory    Hypertension     On lisinopril-hydrochlorothiazide.  continue with current medical management  Continue to monitor and adjust meds based on clinical course.              Musculoskeletal    Acute hematogenous osteomyelitis of left foot (CMS/[facility]) - Primary     S/p left 2nd toe amputation, I&D 3/27.    Follow up w with Dr Thrasher in wound clinic.  Zosyn was stopped due to a rash that has resolved, switched to meropenem through ID.  Weekly labs to ID.           Diabetic foot infection (CMS/[facility])     Follow up with podiatry as scheduled.   Seeing Dr Thrasher next week.   He has been observed not maintaining weight bearing restrictions, this was clarified with podiatrist and he is to be 50% weight bearing.             Endocrine/Metabolic    Type 2 diabetes mellitus (CMS/[facility])     Hgb A1C 9.1.  on glimeperide, humalog ss coverage and premeal and lantus.  Blood sugars 's  since admission.  Continue to monitor and adjust meds based on clinical course.              Other    Streptococcal bacteremia     Zosyn switched to meropenem through 4/24 due to rash that has resolved.   Weekly labs ordered and to be faxed to ID.          labs/meds/orders reviewed  staff to monitor and notify for any changes.  continue with therapy as able.  Follow up with ID and podiatry as scheduled.   Weekly labs to ID.

## 2023-04-20 NOTE — ASSESSMENT & PLAN NOTE
S/p left 2nd toe amputation, I&D 3/27.    Follow up w with Dr Thrasher in wound clinic.  Zosyn was stopped due to a rash that has resolved, switched to meropenem through ID.  Weekly labs to ID.

## 2023-04-20 NOTE — ASSESSMENT & PLAN NOTE
Hgb A1C 9.1.  on glimeperide, humalog ss coverage and premeal and lantus.  Blood sugars 's  since admission.  Continue to monitor and adjust meds based on clinical course.

## 2023-04-27 ENCOUNTER — NURSING HOME VISIT (OUTPATIENT)
Dept: POST ACUTE CARE | Facility: EXTERNAL LOCATION | Age: 73
End: 2023-04-27
Payer: MEDICARE

## 2023-04-27 VITALS
DIASTOLIC BLOOD PRESSURE: 74 MMHG | RESPIRATION RATE: 18 BRPM | SYSTOLIC BLOOD PRESSURE: 138 MMHG | HEART RATE: 64 BPM | BODY MASS INDEX: 31.17 KG/M2 | WEIGHT: 205 LBS | TEMPERATURE: 97.9 F | OXYGEN SATURATION: 97 %

## 2023-04-27 DIAGNOSIS — E11.621 TYPE 2 DIABETES MELLITUS WITH FOOT ULCER, WITH LONG-TERM CURRENT USE OF INSULIN (MULTI): ICD-10-CM

## 2023-04-27 DIAGNOSIS — L97.509 TYPE 2 DIABETES MELLITUS WITH FOOT ULCER, WITH LONG-TERM CURRENT USE OF INSULIN (MULTI): ICD-10-CM

## 2023-04-27 DIAGNOSIS — M86.072 ACUTE HEMATOGENOUS OSTEOMYELITIS OF LEFT FOOT (MULTI): Primary | ICD-10-CM

## 2023-04-27 DIAGNOSIS — I10 PRIMARY HYPERTENSION: ICD-10-CM

## 2023-04-27 DIAGNOSIS — L08.9 DIABETIC FOOT INFECTION (MULTI): ICD-10-CM

## 2023-04-27 DIAGNOSIS — Z79.4 TYPE 2 DIABETES MELLITUS WITH FOOT ULCER, WITH LONG-TERM CURRENT USE OF INSULIN (MULTI): ICD-10-CM

## 2023-04-27 DIAGNOSIS — E11.628 DIABETIC FOOT INFECTION (MULTI): ICD-10-CM

## 2023-04-27 PROCEDURE — 99315 NF DSCHRG MGMT 30 MIN/LESS: CPT | Performed by: NURSE PRACTITIONER

## 2023-04-27 NOTE — LETTER
Patient: Marco Antonio Gill  : 1950    Encounter Date: 2023    Subjective  Marco Antonio Gill is a 72 y.o. male Here for weekly skilled visit.  HPI   Health problems reviewed and no acute concerns.  Participating in therapy and feeling stronger.  Eating and drinking well.  he denies any complaints of pain.  No concerns per staff.   He is scheduled to see ID later today.  weekly labs faxed to ID.  He is following up with podiatry.    He has a surgical shoe in place.  He is planning on discharge home tomorrow and feels ready for discharge with [facility] for dressing changes.       Review of Systems   Constitutional:  Negative for chills, fatigue and fever.   Respiratory:  Negative for cough and shortness of breath.    Cardiovascular:  Negative for chest pain and palpitations.   Gastrointestinal:  Negative for abdominal pain, constipation, diarrhea, nausea and vomiting.   Genitourinary:  Negative for difficulty urinating.       Objective  /74   Pulse 64   Temp 36.6 °C (97.9 °F)   Resp 18   Wt 93 kg (205 lb)   SpO2 97%   BMI 31.17 kg/m²     Physical Exam  Constitutional:       General: He is not in acute distress.  HENT:      Head: Normocephalic and atraumatic.   Eyes:      Conjunctiva/sclera: Conjunctivae normal.   Cardiovascular:      Rate and Rhythm: Normal rate and regular rhythm.   Pulmonary:      Effort: Pulmonary effort is normal. No respiratory distress.      Breath sounds: Normal breath sounds.   Abdominal:      General: Bowel sounds are normal. There is no distension.      Palpations: Abdomen is soft.      Tenderness: There is no abdominal tenderness.   Musculoskeletal:         General: Normal range of motion.      Right lower leg: No edema.      Left lower leg: No edema.      Comments: Dressing, and surgical shoe to left foot, intact.   PICC to LUE intact    Skin:     General: Skin is warm and dry.   Neurological:      General: No focal deficit present.      Mental Status: He is alert and oriented  to person, place, and time.   Psychiatric:         Mood and Affect: Mood normal.         Behavior: Behavior normal.         Assessment/Plan  Problem List Items Addressed This Visit          Circulatory    Hypertension     On lisinopril-hydrochlorothiazide.  continue with current medical management  Continue to monitor and adjust meds based on clinical course.              Musculoskeletal    Acute hematogenous osteomyelitis of left foot (CMS/[facility]) - Primary     S/p left 2nd toe amputation, I&D 3/27.    Follow up w with Dr Thrasher in wound clinic.  Zosyn was stopped due to a rash that has resolved, he is scheduled to see Dr Marquez later today.  Plans for discharge tomorrow with [facility].           Diabetic foot infection (CMS/[facility])     Follow up with podiatry as scheduled.   Seeing Dr Thrasher next week.   He is to be 50% weight bearing.             Endocrine/Metabolic    Type 2 diabetes mellitus (CMS/[facility])     Hgb A1C 9.1.  on glimeperide, humalog ss coverage and premeal and lantus.  Blood sugars 's  since admission.  Continue to monitor and adjust meds based on clinical course.          labs/meds/orders reviewed  staff to monitor and notify for any changes.  continue with therapy as able.  Follow up with ID and podiatry as scheduled.   Acceptable for discharge with [facility].  Requires use of assistive device for ambulation.  Folllow up with PCP 1-2 weeks.  Scripts left in anticipation of discharge.      Electronically Signed By: BLU Martínez   4/28/23  1:50 PM

## 2023-04-27 NOTE — PROGRESS NOTES
Subjective   Marco Antonio Gill is a 72 y.o. male Here for weekly skilled visit.  HPI   Health problems reviewed and no acute concerns.  Participating in therapy and feeling stronger.  Eating and drinking well.  he denies any complaints of pain.  No concerns per staff.   He is scheduled to see ID later today.  weekly labs faxed to ID.  He is following up with podiatry.    He has a surgical shoe in place.  He is planning on discharge home tomorrow and feels ready for discharge with [facility] for dressing changes.       Review of Systems   Constitutional:  Negative for chills, fatigue and fever.   Respiratory:  Negative for cough and shortness of breath.    Cardiovascular:  Negative for chest pain and palpitations.   Gastrointestinal:  Negative for abdominal pain, constipation, diarrhea, nausea and vomiting.   Genitourinary:  Negative for difficulty urinating.       Objective   /74   Pulse 64   Temp 36.6 °C (97.9 °F)   Resp 18   Wt 93 kg (205 lb)   SpO2 97%   BMI 31.17 kg/m²     Physical Exam  Constitutional:       General: He is not in acute distress.  HENT:      Head: Normocephalic and atraumatic.   Eyes:      Conjunctiva/sclera: Conjunctivae normal.   Cardiovascular:      Rate and Rhythm: Normal rate and regular rhythm.   Pulmonary:      Effort: Pulmonary effort is normal. No respiratory distress.      Breath sounds: Normal breath sounds.   Abdominal:      General: Bowel sounds are normal. There is no distension.      Palpations: Abdomen is soft.      Tenderness: There is no abdominal tenderness.   Musculoskeletal:         General: Normal range of motion.      Right lower leg: No edema.      Left lower leg: No edema.      Comments: Dressing, and surgical shoe to left foot, intact.   PICC to LUE intact    Skin:     General: Skin is warm and dry.   Neurological:      General: No focal deficit present.      Mental Status: He is alert and oriented to person, place, and time.   Psychiatric:         Mood and Affect:  Mood normal.         Behavior: Behavior normal.         Assessment/Plan   Problem List Items Addressed This Visit          Circulatory    Hypertension     On lisinopril-hydrochlorothiazide.  continue with current medical management  Continue to monitor and adjust meds based on clinical course.              Musculoskeletal    Acute hematogenous osteomyelitis of left foot (CMS/[facility]) - Primary     S/p left 2nd toe amputation, I&D 3/27.    Follow up w with Dr Thrasher in wound clinic.  Zosyn was stopped due to a rash that has resolved, he is scheduled to see Dr Marquez later today.  Plans for discharge tomorrow with [facility].           Diabetic foot infection (CMS/[facility])     Follow up with podiatry as scheduled.   Seeing Dr Thrasher next week.   He is to be 50% weight bearing.             Endocrine/Metabolic    Type 2 diabetes mellitus (CMS/[facility])     Hgb A1C 9.1.  on glimeperide, humalog ss coverage and premeal and lantus.  Blood sugars 's  since admission.  Continue to monitor and adjust meds based on clinical course.          labs/meds/orders reviewed  staff to monitor and notify for any changes.  continue with therapy as able.  Follow up with ID and podiatry as scheduled.   Acceptable for discharge with [facility].  Requires use of assistive device for ambulation.  Folllow up with PCP 1-2 weeks.  Scripts left in anticipation of discharge.

## 2023-04-28 ASSESSMENT — ENCOUNTER SYMPTOMS
CHILLS: 0
DIARRHEA: 0
CONSTIPATION: 0
ABDOMINAL PAIN: 0
FEVER: 0
COUGH: 0
FATIGUE: 0
PALPITATIONS: 0
VOMITING: 0
NAUSEA: 0
DIFFICULTY URINATING: 0
SHORTNESS OF BREATH: 0

## 2023-04-28 NOTE — ASSESSMENT & PLAN NOTE
Follow up with podiatry as scheduled.   Seeing Dr Thrasher next week.   He is to be 50% weight bearing.

## 2023-04-28 NOTE — ASSESSMENT & PLAN NOTE
S/p left 2nd toe amputation, I&D 3/27.    Follow up w with Dr Thrasher in wound clinic.  Zosyn was stopped due to a rash that has resolved, he is scheduled to see Dr Marquez later today.  Plans for discharge tomorrow with [facility].

## 2023-05-01 ENCOUNTER — PATIENT OUTREACH (OUTPATIENT)
Dept: PRIMARY CARE | Facility: CLINIC | Age: 73
End: 2023-05-01
Payer: MEDICARE

## 2023-05-01 ENCOUNTER — DOCUMENTATION (OUTPATIENT)
Dept: PRIMARY CARE | Facility: CLINIC | Age: 73
End: 2023-05-01
Payer: MEDICARE

## 2023-05-01 ENCOUNTER — LAB (OUTPATIENT)
Dept: LAB | Facility: LAB | Age: 73
End: 2023-05-01
Payer: MEDICARE

## 2023-05-01 DIAGNOSIS — L97.509 TYPE 2 DIABETES MELLITUS WITH FOOT ULCER, WITH LONG-TERM CURRENT USE OF INSULIN (MULTI): Primary | ICD-10-CM

## 2023-05-01 DIAGNOSIS — I10 PRIMARY HYPERTENSION: ICD-10-CM

## 2023-05-01 DIAGNOSIS — E11.621 TYPE 2 DIABETES MELLITUS WITH FOOT ULCER, WITH LONG-TERM CURRENT USE OF INSULIN (MULTI): Primary | ICD-10-CM

## 2023-05-01 DIAGNOSIS — Z79.4 TYPE 2 DIABETES MELLITUS WITH FOOT ULCER, WITH LONG-TERM CURRENT USE OF INSULIN (MULTI): Primary | ICD-10-CM

## 2023-05-01 DIAGNOSIS — M86.072 ACUTE HEMATOGENOUS OSTEOMYELITIS OF LEFT FOOT (MULTI): ICD-10-CM

## 2023-05-01 DIAGNOSIS — Z79.4 TYPE 2 DIABETES MELLITUS WITH FOOT ULCER, WITH LONG-TERM CURRENT USE OF INSULIN (MULTI): ICD-10-CM

## 2023-05-01 DIAGNOSIS — L97.509 TYPE 2 DIABETES MELLITUS WITH FOOT ULCER, WITH LONG-TERM CURRENT USE OF INSULIN (MULTI): ICD-10-CM

## 2023-05-01 DIAGNOSIS — E11.621 TYPE 2 DIABETES MELLITUS WITH FOOT ULCER, WITH LONG-TERM CURRENT USE OF INSULIN (MULTI): ICD-10-CM

## 2023-05-01 LAB
ANION GAP IN SER/PLAS: 12 MMOL/L (ref 10–20)
CALCIUM (MG/DL) IN SER/PLAS: 9.1 MG/DL (ref 8.6–10.3)
CARBON DIOXIDE, TOTAL (MMOL/L) IN SER/PLAS: 28 MMOL/L (ref 21–32)
CHLORIDE (MMOL/L) IN SER/PLAS: 101 MMOL/L (ref 98–107)
CHOLESTEROL (MG/DL) IN SER/PLAS: 114 MG/DL (ref 0–199)
CHOLESTEROL IN HDL (MG/DL) IN SER/PLAS: 45.4 MG/DL
CHOLESTEROL/HDL RATIO: 2.5
CREATININE (MG/DL) IN SER/PLAS: 1.24 MG/DL (ref 0.5–1.3)
GFR MALE: 61 ML/MIN/1.73M2
GLUCOSE (MG/DL) IN SER/PLAS: 243 MG/DL (ref 74–99)
LDL: 45 MG/DL (ref 0–99)
POTASSIUM (MMOL/L) IN SER/PLAS: 4.2 MMOL/L (ref 3.5–5.3)
SODIUM (MMOL/L) IN SER/PLAS: 137 MMOL/L (ref 136–145)
TRIGLYCERIDE (MG/DL) IN SER/PLAS: 116 MG/DL (ref 0–149)
UREA NITROGEN (MG/DL) IN SER/PLAS: 32 MG/DL (ref 6–23)
VLDL: 23 MG/DL (ref 0–40)

## 2023-05-01 PROCEDURE — 36415 COLL VENOUS BLD VENIPUNCTURE: CPT

## 2023-05-01 PROCEDURE — 83036 HEMOGLOBIN GLYCOSYLATED A1C: CPT

## 2023-05-01 PROCEDURE — 80061 LIPID PANEL: CPT

## 2023-05-01 PROCEDURE — 80048 BASIC METABOLIC PNL TOTAL CA: CPT

## 2023-05-01 RX ORDER — TALC
3 POWDER (GRAM) TOPICAL NIGHTLY
COMMUNITY
Start: 2023-03-30

## 2023-05-01 RX ORDER — PIPERACILLIN SODIUM, TAZOBACTAM SODIUM 3; .375 G/15ML; G/15ML
INJECTION, POWDER, LYOPHILIZED, FOR SOLUTION INTRAVENOUS
COMMUNITY
Start: 2023-03-30 | End: 2023-05-02 | Stop reason: WASHOUT

## 2023-05-01 RX ORDER — LEVOFLOXACIN 750 MG/1
750 TABLET ORAL
Qty: 14 TABLET | Refills: 0 | COMMUNITY
Start: 2023-04-28 | End: 2023-05-12

## 2023-05-01 NOTE — PROGRESS NOTES
Discharge Facility:  [facility]   Discharge Diagnosis:  Cellulitis of left lower limB, acute osteomyelitis, left ankle and foot  Admission Date:   4/5/23   Discharge Date:  4/28/23    PCP Appointment Date:    5/2/23   Specialist Appointment Date:  5/4/23  Hospital Encounter and Summary: Linked   See discharge assessment below for further details  Wound vac to be delivered today and applied Friday 5/5/23   [facility] started care  4/29/23     Engagement  Call Start Time: 1020 (5/1/2023 10:35 AM)    Medications  Medications reviewed with patient/caregiver?: Yes (5/1/2023 10:35 AM)  Is the patient having any side effects they believe may be caused by any medication additions or changes?: No (5/1/2023 10:35 AM)  Does the patient have all medications ordered at discharge?: Yes (5/1/2023 10:35 AM)  Care Management Interventions: No intervention needed (5/1/2023 10:35 AM)  Prescription Comments: pt states he picked up all meds. (5/1/2023 10:35 AM)  Is the patient taking all medications as directed (includes completed medication regime)?: Yes (5/1/2023 10:35 AM)    Appointments  Does the patient have a primary care provider?: Yes (5/1/2023 10:35 AM)  Care Management Interventions: Verified appointment date/time/provider (5/1/2023 10:35 AM)  Has the patient kept scheduled appointments due by today?: Yes (5/1/2023 10:35 AM)  Care Management Interventions: Advised patient to keep appointment (5/1/2023 10:35 AM)    Self Management  What is the home health agency?: [facility] (5/1/2023 10:35 AM)  Has home health visited the patient within 72 hours of discharge?: Yes (5/1/2023 10:35 AM)  What Durable Medical Equipment (DME) was ordered?: WOUND VAC TO BE DELIVERED TODAY (5/1/2023 10:35 AM)    Patient Teaching  Does the patient have access to their discharge instructions?: Yes (5/1/2023 10:35 AM)  Care Management Interventions: Reviewed instructions with patient (5/1/2023 10:35 AM)  What is the patient's perception of their  health status since discharge?: Improving (5/1/2023 10:35 AM)  Is the patient/caregiver able to teach back the hierarchy of who to call/visit for symptoms/problems? PCP, Specialist, Home Health nurse, Urgent Care, ED, 911: Yes (5/1/2023 10:35 AM)    Wrap Up  Is the patient/caregiver familiar with Advance Care Planning?: Yes (5/1/2023 10:35 AM)  Would the patient like more information on Advance Care Planning?: No (5/1/2023 10:35 AM)  Wrap Up Additional Comments: Pt states he is doing well at home. no questions or concerns. pt advised top bring d/c paperwork to appt 5/2/23 (5/1/2023 10:35 AM)  Call End Time: 1040 (5/1/2023 10:35 AM)

## 2023-05-02 ENCOUNTER — OFFICE VISIT (OUTPATIENT)
Dept: PRIMARY CARE | Facility: CLINIC | Age: 73
End: 2023-05-02
Payer: MEDICARE

## 2023-05-02 ENCOUNTER — DOCUMENTATION (OUTPATIENT)
Dept: PRIMARY CARE | Facility: CLINIC | Age: 73
End: 2023-05-02

## 2023-05-02 VITALS
WEIGHT: 203.1 LBS | OXYGEN SATURATION: 96 % | HEIGHT: 68 IN | TEMPERATURE: 98 F | SYSTOLIC BLOOD PRESSURE: 145 MMHG | HEART RATE: 92 BPM | DIASTOLIC BLOOD PRESSURE: 77 MMHG | BODY MASS INDEX: 30.78 KG/M2

## 2023-05-02 DIAGNOSIS — M86.072 ACUTE HEMATOGENOUS OSTEOMYELITIS OF LEFT FOOT (MULTI): ICD-10-CM

## 2023-05-02 DIAGNOSIS — E55.9 VITAMIN D DEFICIENCY: ICD-10-CM

## 2023-05-02 DIAGNOSIS — E78.2 MIXED HYPERLIPIDEMIA: ICD-10-CM

## 2023-05-02 DIAGNOSIS — I63.9 ISCHEMIC STROKE (MULTI): ICD-10-CM

## 2023-05-02 DIAGNOSIS — I10 PRIMARY HYPERTENSION: ICD-10-CM

## 2023-05-02 DIAGNOSIS — S98.132A AMPUTATION OF TOE OF LEFT FOOT (CMS-HCC): ICD-10-CM

## 2023-05-02 DIAGNOSIS — E11.621 TYPE 2 DIABETES MELLITUS WITH FOOT ULCER, WITH LONG-TERM CURRENT USE OF INSULIN (MULTI): ICD-10-CM

## 2023-05-02 DIAGNOSIS — L97.509 TYPE 2 DIABETES MELLITUS WITH FOOT ULCER, WITH LONG-TERM CURRENT USE OF INSULIN (MULTI): ICD-10-CM

## 2023-05-02 DIAGNOSIS — Z12.5 PROSTATE CANCER SCREENING: ICD-10-CM

## 2023-05-02 DIAGNOSIS — Z09 HOSPITAL DISCHARGE FOLLOW-UP: Primary | ICD-10-CM

## 2023-05-02 DIAGNOSIS — Z79.4 TYPE 2 DIABETES MELLITUS WITH FOOT ULCER, WITH LONG-TERM CURRENT USE OF INSULIN (MULTI): ICD-10-CM

## 2023-05-02 PROBLEM — R03.0 BLOOD PRESSURE ELEVATED WITHOUT HISTORY OF HTN: Status: RESOLVED | Noted: 2023-04-11 | Resolved: 2023-05-02

## 2023-05-02 LAB
ESTIMATED AVERAGE GLUCOSE FOR HBA1C: 206 MG/DL
HEMOGLOBIN A1C/HEMOGLOBIN TOTAL IN BLOOD: 8.8 %

## 2023-05-02 PROCEDURE — 3052F HG A1C>EQUAL 8.0%<EQUAL 9.0%: CPT | Performed by: FAMILY MEDICINE

## 2023-05-02 PROCEDURE — 3077F SYST BP >= 140 MM HG: CPT | Performed by: FAMILY MEDICINE

## 2023-05-02 PROCEDURE — 1160F RVW MEDS BY RX/DR IN RCRD: CPT | Performed by: FAMILY MEDICINE

## 2023-05-02 PROCEDURE — 1036F TOBACCO NON-USER: CPT | Performed by: FAMILY MEDICINE

## 2023-05-02 PROCEDURE — 99496 TRANSJ CARE MGMT HIGH F2F 7D: CPT | Performed by: FAMILY MEDICINE

## 2023-05-02 PROCEDURE — 3078F DIAST BP <80 MM HG: CPT | Performed by: FAMILY MEDICINE

## 2023-05-02 PROCEDURE — 1159F MED LIST DOCD IN RCRD: CPT | Performed by: FAMILY MEDICINE

## 2023-05-02 RX ORDER — BLOOD-GLUCOSE TRANSMITTER
EACH MISCELLANEOUS
Qty: 1 EACH | Refills: 4 | Status: SHIPPED | OUTPATIENT
Start: 2023-05-02 | End: 2024-06-05 | Stop reason: WASHOUT

## 2023-05-02 RX ORDER — INSULIN GLARGINE 100 [IU]/ML
70 INJECTION, SOLUTION SUBCUTANEOUS EVERY 24 HOURS
Qty: 10 ML | Refills: 0 | Status: SHIPPED | OUTPATIENT
Start: 2023-05-02 | End: 2023-05-30 | Stop reason: SDUPTHER

## 2023-05-02 RX ORDER — BLOOD-GLUCOSE SENSOR
EACH MISCELLANEOUS
Qty: 9 EACH | Refills: 3 | Status: SHIPPED | OUTPATIENT
Start: 2023-05-02 | End: 2024-06-05 | Stop reason: WASHOUT

## 2023-05-02 RX ORDER — BLOOD-GLUCOSE,RECEIVER,CONT
EACH MISCELLANEOUS
Qty: 1 EACH | Refills: 0 | Status: SHIPPED | OUTPATIENT
Start: 2023-05-02 | End: 2024-06-05 | Stop reason: WASHOUT

## 2023-05-02 ASSESSMENT — PATIENT HEALTH QUESTIONNAIRE - PHQ9
1. LITTLE INTEREST OR PLEASURE IN DOING THINGS: NOT AT ALL
SUM OF ALL RESPONSES TO PHQ9 QUESTIONS 1 AND 2: 0
2. FEELING DOWN, DEPRESSED OR HOPELESS: NOT AT ALL

## 2023-05-02 NOTE — PROGRESS NOTES
Subjective   Patient ID: Marco Antonio Gill is a 72 y.o. male who presents for follow up after hospital discharge and for 3 month follow up for monitoring and management of multiple medical conditions.        HPI     Since last seen:  Patient states he had went to the ER due to his left foot being swollen.   He believes he had stepped on something which ultimately led to infection.  He was admitted to [facility] for 2 weeks for IV antibiotics and had to have 2nd toe of the left foot amputated due to the infection.    Admission Date: .24-Mar-2023 20:13:00   Discharge Date: 30-Mar-2023   Admission Reason: Cellulitis/abscess, left foot  Final Discharge Diagnoses: Bacteremia, cellulitis, foot wound   Procedures: Date: 27-Mar-2023 19:00:00   Procedure Name: 1. INCISION & DEBRIDEMENT: LEFT FOOTW/2ND DIGIT AMPUTATION   Discharge Medications: Home Medication:     glimepiride 4 mg oral tablet - 2 tab(s) orally once a day   lisinopril-hydrochlorothiazide 20 mg-12.5 mg oral tablet - 2 tab(s) orally once   a day   aspirin 81 mg oral tablet, chewable - 1 tab(s) orally once a day   Vitamin D3 50 mcg (2000 intl units) oral tablet - 1 tab(s) orally once a day   atorvastatin 20 mg oral tablet - 1 tab(s) orally once a day (in the morning)   Insulin Lispro KwikPen 100 units/mL injectable solution - 25 unit(s)   subcutaneous in the morning and at bedtime   insulin glargine 100 units/mL subcutaneous solution - 70 unit(s) subcutaneous   every 24 hours   insulin lispro 100 units/mL injectable solution - Mild insulin sliding scale   with meals and bedtime   piperacillin-tazobactam 3 g-0.375 g/50 mL intravenous solution -  intravenous for 4-6 weeks     He was then admitted to rehab facility:  Patient was in the rehab facility to recover from foot infection and surgery.  Discharge Facility:  [facility]   Discharge Diagnosis:  Cellulitis of left lower limb, acute osteomyelitis, left ankle and foot  Admission Date:    "4/5/23   Discharge Date:  4/28/23     PCP Appointment Date:    5/2/23   Specialist Appointment Date:  5/4/23  Wound vac to be delivered today and applied Friday 5/5/23   [facility] started care  4/29/23     He will be following with podiatry and ID.  Completed a total of 6 weeks of antibiotic therapy with IV Zosyn for 4 weeks and levaquin for 2 weeks.  Pt reports that the foot wound continues to heal.   No fever, chills, or drainage from the wound.        He has hypertension.  Pt does monitor his BP.  He is compliant with his antihypertensive therapy denies any side effects.   He denies CP, SOB, and dizziness.      Patient has hyperlipidemia.  He is treated with Atorvastatin.   Pt tries to limit the amount of fatty foods and high cholesterol foods that he consumes  Patient is compliant with his atorvastatin therapy and denies any noted side effects.      Pt has Type 2 diabetes.   He monitors his blood glucose and keeps a log of his readings.  Patient does try to limit the amount of carbohydrates that are consumed.  He denies any hypoglycemic symptoms or readings.   Also denies any polyuria, any tingling in the extremities.  5/2/2023:  He now wishes to proceed with the continuous glucose monitoring that was previously recommended.     He had a stroke in 2016 and is currently taking aspirin for prophylaxis.   He was previously treated with Plavix, but discontinued it on his own in September 2016 as he felt that it made him feel \"drugged.\"  Pt has refused any additional antiplatelet medication beyond the aspirin.     Review of Systems  Constitutional: Patient denies any fever, chills, loss of appetite, or unexplained weight loss.  Cardiovascular: Patient denies any chest pain, shortness of breath with exertion, tachycardia, palpitations, orthopnea, or paroxysmal nocturnal dyspnea.  Respiratory: Patient denies any cough, shortness breath, or wheezing.  Gastrointestinal: Patient denies any nausea, vomiting, diarrhea, " "constipation, melena, hematochezia, or reflux symptoms.    Musculoskeletal: He has some mild pain at the surgical site of the left foot.    Skin: Denies any rashes or skin lesions.   Neurology: Patient denies any new motor or sensory losses. Denies any numbness, tingling, weakness, and incoordination of the extremities. Patient also denies any tremor, seizures, or gait instability.  Endocrinology: Denies any polyuria, polydipsia, polyphagia, or heat/cold intolerance.     SEE HISTORY OF PRESENT ILLNESS ALSO       Objective   /77   Pulse 92   Temp 36.7 °C (98 °F)   Ht 1.727 m (5' 8\")   Wt 92.1 kg (203 lb 1.6 oz)   SpO2 96%   BMI 30.88 kg/m²     Physical Exam  Gen. appearance: Alert and cooperative, no acute distress, well-developed/well-nourished obese male.     Head: Normocephalic and atraumatic.  Neck: Supple and without adenopathy or rigidity. There is no JVD at 90Â° and no carotid bruits are noted.  Cardiovascular: Heart has a regular rate and rhythm without murmur or ectopy.  Respiratory: Lungs are clear to auscultation bilaterally with good air exchange.  Good respiratory effort no accessory muscle use.    Extremities: No clubbing, cyanosis, or edema   There is an extensive dressing applied to the left foot.  Dressing is dry and intact.    Neurological exam: Alert and oriented x3, no tremor.  Psychiatric: Appropriate mood and affect, good insight and judgment, no delusions or thought disorders, no suicidal or homicidal ideation.  Skin: Good turgor, warm, moist and without rashes or lesions.       Assessment/Plan     Hospital discharge follow-up / osteomyelitis of left 2nd toe:  Patient experienced osteomyelitis of the left foot requiring hospitalization.  He underwent epilation of the left second toe and a full 6-week course of antibiotic therapy.  He is to continue following with podiatry and infectious disease.  Wound care per podiatry.  Medication list was reviewed with patient.    HTN:  BP is " "mildly elevated on exam today.  We will continue the current medication and further address if remains elevated.  Pt believes blood pressure is not consistently elevated.    Hyperlipidemia: Stable.   Patient will continue with the current medication, Atorvastatin.   Dietary changes, exercise, and maintenance of healthy weight were discussed at length.    Diabetes mellitus type 2: Most recent A1c was 8.8%.  7/11/2022: We increased Lantus from 58 to 62 units daily. We increased Humalog from 21 to 23 units twice daily with meals.  10/11/22: We increased the dosing of his Humalog to 25 units twice a day with meals.  1/10/2023: Recommended continuous glucose monitor to pt. He stated he will consider this at his next visit in April.  Pt was advised to add 15 units of Humalog with his lunch.  5/2/2023:   WE will change the Humalog to 25/15/25 Units with meals.  Continue Lantus at 70 Units once daily.  He now wishes to proceed with the continuous glucose monitoring and that was ordered for him.  Pt previously stopped metformin and refused to restart it as he believes it is \"not good\" for him.  Dietary changes, exercise, and maintenance of a healthy weight were discussed at length.  Patient was advised to have a diabetic eye exam annually.  Patient was also advised to check the feet on a regular basis.    Hx of stroke: He will continue on daily aspirin therapy.   He discontinued Plavix in 2016 as it made him feel \"drugged\" and he wishes to remain on ASA only.  He has refused any additional anticoagulation despite my advice and encouragement.    Vitamin D deficiency: Vitamin D still low at 29 on recent labs.   3/7/2022: We will increase the dose of his vitamin D to 4000 units daily this recent labs continue to show a low vitamin D level.  We will continue to monitor.       Pt to continues to refuse colon CA screening.  PSA due 1/10/2024  "

## 2023-05-02 NOTE — PATIENT INSTRUCTIONS
Follow up in 3 months with labs to be done PRIOR.    It was a pleasure to see you today. Thank you for choosing us for your health care needs.    If you have lab or other testing completed and have not been informed of results within one week, please call the office for your results.    If you haven't done so, consider signing up for [facility] [employer], the [facility] personal health record. Ask the staff how you can get started.

## 2023-05-09 ENCOUNTER — PATIENT OUTREACH (OUTPATIENT)
Dept: PRIMARY CARE | Facility: CLINIC | Age: 73
End: 2023-05-09
Payer: MEDICARE

## 2023-05-09 NOTE — PROGRESS NOTES
Call regarding appt. with PCP on 5/2/23 after hospitalization.  At time of outreach call the patient feels as if their condition has (improved) since last visit.  Reviewed the PCP appointment with the pt and addressed any questions or concerns.  Dr Thrasher appt  tomorrow 5/10/23. Pt states he has wound vac on now since insurance approved it.   Contact info provided.

## 2023-05-15 DIAGNOSIS — E11.621 TYPE 2 DIABETES MELLITUS WITH FOOT ULCER, WITH LONG-TERM CURRENT USE OF INSULIN (MULTI): Primary | ICD-10-CM

## 2023-05-15 DIAGNOSIS — I10 PRIMARY HYPERTENSION: ICD-10-CM

## 2023-05-15 DIAGNOSIS — Z79.4 TYPE 2 DIABETES MELLITUS WITH FOOT ULCER, WITH LONG-TERM CURRENT USE OF INSULIN (MULTI): Primary | ICD-10-CM

## 2023-05-15 DIAGNOSIS — L97.509 TYPE 2 DIABETES MELLITUS WITH FOOT ULCER, WITH LONG-TERM CURRENT USE OF INSULIN (MULTI): Primary | ICD-10-CM

## 2023-05-15 NOTE — TELEPHONE ENCOUNTER
Last appt5/2/23  Next appt 8/1/23    **Humalog dose changes were made during his last visit but not updated in chart**

## 2023-05-19 RX ORDER — LISINOPRIL AND HYDROCHLOROTHIAZIDE 12.5; 2 MG/1; MG/1
2 TABLET ORAL DAILY
Qty: 180 TABLET | Refills: 0 | Status: SHIPPED | OUTPATIENT
Start: 2023-05-19 | End: 2023-08-16 | Stop reason: SDUPTHER

## 2023-05-19 RX ORDER — INSULIN LISPRO 100 [IU]/ML
INJECTION, SOLUTION INTRAVENOUS; SUBCUTANEOUS
Qty: 7 EACH | Refills: 2 | Status: SHIPPED | OUTPATIENT
Start: 2023-05-19 | End: 2023-08-14 | Stop reason: SDUPTHER

## 2023-05-30 DIAGNOSIS — Z79.4 TYPE 2 DIABETES MELLITUS WITH FOOT ULCER, WITH LONG-TERM CURRENT USE OF INSULIN (MULTI): Primary | ICD-10-CM

## 2023-05-30 DIAGNOSIS — E11.621 TYPE 2 DIABETES MELLITUS WITH FOOT ULCER, WITH LONG-TERM CURRENT USE OF INSULIN (MULTI): Primary | ICD-10-CM

## 2023-05-30 DIAGNOSIS — L97.509 TYPE 2 DIABETES MELLITUS WITH FOOT ULCER, WITH LONG-TERM CURRENT USE OF INSULIN (MULTI): Primary | ICD-10-CM

## 2023-05-30 RX ORDER — INSULIN GLARGINE 100 [IU]/ML
70 INJECTION, SOLUTION SUBCUTANEOUS EVERY 24 HOURS
Qty: 21 ML | Refills: 0 | Status: SHIPPED | OUTPATIENT
Start: 2023-05-30 | End: 2023-06-13

## 2023-06-12 DIAGNOSIS — E11.621 TYPE 2 DIABETES MELLITUS WITH FOOT ULCER, WITH LONG-TERM CURRENT USE OF INSULIN (MULTI): Primary | ICD-10-CM

## 2023-06-12 DIAGNOSIS — L97.509 TYPE 2 DIABETES MELLITUS WITH FOOT ULCER, WITH LONG-TERM CURRENT USE OF INSULIN (MULTI): Primary | ICD-10-CM

## 2023-06-12 DIAGNOSIS — Z79.4 TYPE 2 DIABETES MELLITUS WITH FOOT ULCER, WITH LONG-TERM CURRENT USE OF INSULIN (MULTI): Primary | ICD-10-CM

## 2023-06-12 RX ORDER — GLIMEPIRIDE 4 MG/1
8 TABLET ORAL
Qty: 180 TABLET | Refills: 0 | Status: SHIPPED | OUTPATIENT
Start: 2023-06-12 | End: 2023-09-11 | Stop reason: SDUPTHER

## 2023-06-13 DIAGNOSIS — Z79.4 TYPE 2 DIABETES MELLITUS WITH FOOT ULCER, WITH LONG-TERM CURRENT USE OF INSULIN (MULTI): ICD-10-CM

## 2023-06-13 DIAGNOSIS — L97.509 TYPE 2 DIABETES MELLITUS WITH FOOT ULCER, WITH LONG-TERM CURRENT USE OF INSULIN (MULTI): Primary | ICD-10-CM

## 2023-06-13 DIAGNOSIS — Z79.4 TYPE 2 DIABETES MELLITUS WITH FOOT ULCER, WITH LONG-TERM CURRENT USE OF INSULIN (MULTI): Primary | ICD-10-CM

## 2023-06-13 DIAGNOSIS — E11.621 TYPE 2 DIABETES MELLITUS WITH FOOT ULCER, WITH LONG-TERM CURRENT USE OF INSULIN (MULTI): Primary | ICD-10-CM

## 2023-06-13 DIAGNOSIS — E11.621 TYPE 2 DIABETES MELLITUS WITH FOOT ULCER, WITH LONG-TERM CURRENT USE OF INSULIN (MULTI): ICD-10-CM

## 2023-06-13 DIAGNOSIS — L97.509 TYPE 2 DIABETES MELLITUS WITH FOOT ULCER, WITH LONG-TERM CURRENT USE OF INSULIN (MULTI): ICD-10-CM

## 2023-06-13 RX ORDER — INSULIN GLARGINE 100 [IU]/ML
70 INJECTION, SOLUTION SUBCUTANEOUS DAILY
Qty: 21 ML | Refills: 2 | Status: SHIPPED | OUTPATIENT
Start: 2023-06-13 | End: 2023-09-11 | Stop reason: SDUPTHER

## 2023-06-13 RX ORDER — INSULIN GLARGINE 100 [IU]/ML
70 INJECTION, SOLUTION SUBCUTANEOUS EVERY 24 HOURS
Qty: 21 ML | Refills: 0 | OUTPATIENT
Start: 2023-06-13

## 2023-06-13 NOTE — PROGRESS NOTES
Pt requested changed to the Lantus pens rather than vials.  New RX sent and medication list updated.

## 2023-06-21 ENCOUNTER — PATIENT OUTREACH (OUTPATIENT)
Dept: CARE COORDINATION | Facility: CLINIC | Age: 73
End: 2023-06-21
Payer: MEDICARE

## 2023-06-21 NOTE — PROGRESS NOTES
Unable to reach patient for call back after patient's follow up appointment with PCP.   LVM with call back number for patient to call if needed

## 2023-07-17 DIAGNOSIS — E78.2 MIXED HYPERLIPIDEMIA: ICD-10-CM

## 2023-07-17 RX ORDER — ATORVASTATIN CALCIUM 20 MG/1
20 TABLET, FILM COATED ORAL DAILY
Qty: 90 TABLET | Refills: 0 | Status: SHIPPED | OUTPATIENT
Start: 2023-07-17 | End: 2023-10-17

## 2023-07-19 DIAGNOSIS — E78.2 MIXED HYPERLIPIDEMIA: ICD-10-CM

## 2023-07-20 RX ORDER — ATORVASTATIN CALCIUM 20 MG/1
20 TABLET, FILM COATED ORAL DAILY
Qty: 90 TABLET | Refills: 0 | OUTPATIENT
Start: 2023-07-20

## 2023-07-29 ENCOUNTER — LAB (OUTPATIENT)
Dept: LAB | Facility: LAB | Age: 73
End: 2023-07-29
Payer: MEDICARE

## 2023-07-29 DIAGNOSIS — L97.509 TYPE 2 DIABETES MELLITUS WITH FOOT ULCER, WITH LONG-TERM CURRENT USE OF INSULIN (MULTI): ICD-10-CM

## 2023-07-29 DIAGNOSIS — Z79.4 TYPE 2 DIABETES MELLITUS WITH FOOT ULCER, WITH LONG-TERM CURRENT USE OF INSULIN (MULTI): ICD-10-CM

## 2023-07-29 DIAGNOSIS — E55.9 VITAMIN D DEFICIENCY: ICD-10-CM

## 2023-07-29 DIAGNOSIS — E11.621 TYPE 2 DIABETES MELLITUS WITH FOOT ULCER, WITH LONG-TERM CURRENT USE OF INSULIN (MULTI): ICD-10-CM

## 2023-07-29 DIAGNOSIS — Z12.5 PROSTATE CANCER SCREENING: ICD-10-CM

## 2023-07-29 LAB
ALANINE AMINOTRANSFERASE (SGPT) (U/L) IN SER/PLAS: 26 U/L (ref 10–52)
ALBUMIN (G/DL) IN SER/PLAS: 3.6 G/DL (ref 3.4–5)
ALKALINE PHOSPHATASE (U/L) IN SER/PLAS: 54 U/L (ref 33–136)
ANION GAP IN SER/PLAS: 13 MMOL/L (ref 10–20)
ASPARTATE AMINOTRANSFERASE (SGOT) (U/L) IN SER/PLAS: 19 U/L (ref 9–39)
BILIRUBIN TOTAL (MG/DL) IN SER/PLAS: 0.7 MG/DL (ref 0–1.2)
CALCIDIOL (25 OH VITAMIN D3) (NG/ML) IN SER/PLAS: 35 NG/ML
CALCIUM (MG/DL) IN SER/PLAS: 8.6 MG/DL (ref 8.6–10.3)
CARBON DIOXIDE, TOTAL (MMOL/L) IN SER/PLAS: 25 MMOL/L (ref 21–32)
CHLORIDE (MMOL/L) IN SER/PLAS: 105 MMOL/L (ref 98–107)
CREATININE (MG/DL) IN SER/PLAS: 1.05 MG/DL (ref 0.5–1.3)
ESTIMATED AVERAGE GLUCOSE FOR HBA1C: 212 MG/DL
GFR MALE: 74 ML/MIN/1.73M2
GLUCOSE (MG/DL) IN SER/PLAS: 218 MG/DL (ref 74–99)
HEMOGLOBIN A1C/HEMOGLOBIN TOTAL IN BLOOD: 9 %
POTASSIUM (MMOL/L) IN SER/PLAS: 3.9 MMOL/L (ref 3.5–5.3)
PROSTATE SPECIFIC ANTIGEN,SCREEN: 2.54 NG/ML (ref 0–4)
PROTEIN TOTAL: 6.8 G/DL (ref 6.4–8.2)
SODIUM (MMOL/L) IN SER/PLAS: 139 MMOL/L (ref 136–145)
THYROTROPIN (MIU/L) IN SER/PLAS BY DETECTION LIMIT <= 0.05 MIU/L: 3.45 MIU/L (ref 0.44–3.98)
UREA NITROGEN (MG/DL) IN SER/PLAS: 29 MG/DL (ref 6–23)

## 2023-07-29 PROCEDURE — 84443 ASSAY THYROID STIM HORMONE: CPT

## 2023-07-29 PROCEDURE — 83036 HEMOGLOBIN GLYCOSYLATED A1C: CPT

## 2023-07-29 PROCEDURE — G0103 PSA SCREENING: HCPCS

## 2023-07-29 PROCEDURE — 82306 VITAMIN D 25 HYDROXY: CPT

## 2023-07-29 PROCEDURE — 80053 COMPREHEN METABOLIC PANEL: CPT

## 2023-07-29 PROCEDURE — 36415 COLL VENOUS BLD VENIPUNCTURE: CPT

## 2023-08-01 ENCOUNTER — OFFICE VISIT (OUTPATIENT)
Dept: PRIMARY CARE | Facility: CLINIC | Age: 73
End: 2023-08-01
Payer: MEDICARE

## 2023-08-01 VITALS
WEIGHT: 215.7 LBS | SYSTOLIC BLOOD PRESSURE: 145 MMHG | HEART RATE: 80 BPM | TEMPERATURE: 98.2 F | HEIGHT: 68 IN | OXYGEN SATURATION: 93 % | DIASTOLIC BLOOD PRESSURE: 71 MMHG | BODY MASS INDEX: 32.69 KG/M2

## 2023-08-01 DIAGNOSIS — Z86.73 HISTORY OF STROKE: ICD-10-CM

## 2023-08-01 DIAGNOSIS — I10 PRIMARY HYPERTENSION: Primary | ICD-10-CM

## 2023-08-01 DIAGNOSIS — E55.9 VITAMIN D DEFICIENCY: ICD-10-CM

## 2023-08-01 DIAGNOSIS — E78.2 MIXED HYPERLIPIDEMIA: ICD-10-CM

## 2023-08-01 DIAGNOSIS — E11.42 TYPE 2 DIABETES MELLITUS WITH DIABETIC POLYNEUROPATHY, WITH LONG-TERM CURRENT USE OF INSULIN (MULTI): ICD-10-CM

## 2023-08-01 DIAGNOSIS — Z79.4 TYPE 2 DIABETES MELLITUS WITH DIABETIC POLYNEUROPATHY, WITH LONG-TERM CURRENT USE OF INSULIN (MULTI): ICD-10-CM

## 2023-08-01 PROCEDURE — 1036F TOBACCO NON-USER: CPT | Performed by: FAMILY MEDICINE

## 2023-08-01 PROCEDURE — 99214 OFFICE O/P EST MOD 30 MIN: CPT | Performed by: FAMILY MEDICINE

## 2023-08-01 PROCEDURE — 3077F SYST BP >= 140 MM HG: CPT | Performed by: FAMILY MEDICINE

## 2023-08-01 PROCEDURE — 1160F RVW MEDS BY RX/DR IN RCRD: CPT | Performed by: FAMILY MEDICINE

## 2023-08-01 PROCEDURE — 1159F MED LIST DOCD IN RCRD: CPT | Performed by: FAMILY MEDICINE

## 2023-08-01 PROCEDURE — 3078F DIAST BP <80 MM HG: CPT | Performed by: FAMILY MEDICINE

## 2023-08-01 PROCEDURE — 3052F HG A1C>EQUAL 8.0%<EQUAL 9.0%: CPT | Performed by: FAMILY MEDICINE

## 2023-08-01 ASSESSMENT — PATIENT HEALTH QUESTIONNAIRE - PHQ9
2. FEELING DOWN, DEPRESSED OR HOPELESS: NOT AT ALL
SUM OF ALL RESPONSES TO PHQ9 QUESTIONS 1 AND 2: 0
1. LITTLE INTEREST OR PLEASURE IN DOING THINGS: NOT AT ALL

## 2023-08-01 NOTE — PATIENT INSTRUCTIONS
Start taking your Lantus at 75 units every day and your Humalog at 27 units with meals three times daily.    Continue the current medications. Follow up in 3 months with labs PRIOR.    It was a pleasure to see you today. Thank you for choosing us for your health care needs.    If you have lab or other testing completed and have not been informed of results within one week, please call the office for your results.    If you haven't done so, consider signing up for [employer], the [facility] personal health record. Ask the staff how you can get started.

## 2023-08-01 NOTE — PROGRESS NOTES
"Subjective   Patient ID: Marco Antonio Gill is a 72 y.o. male who presents for Follow-up.    HPI     No new concerns     Labs  07/29/2023    He has hypertension.  Pt does monitor his BP.  He is compliant with his antihypertensive therapy denies any side effects.   He denies CP, SOB, and dizziness.      Patient has hyperlipidemia.  He is treated with Atorvastatin.   Pt tries to limit the amount of fatty foods and high cholesterol foods that he consumes  Patient is compliant with his atorvastatin therapy and denies any noted side effects.      Pt has Type 2 diabetes.   He monitors his blood glucose and keeps a log of his readings.  Patient does try to limit the amount of carbohydrates that are consumed.  He denies any hypoglycemic symptoms or readings.   Also denies any polyuria, any tingling in the extremities.       He has a right foot ulceration that he states is almost healed.  Currently being treated with hyperbaric oxygen treatment.  The site of amputation of his second toe on the left foot has healed.        He had a stroke in 2016 and is currently taking aspirin for prophylaxis.   He was previously treated with Plavix, but discontinued it on his own in September 2016 as he felt that it made him feel \"drugged.\"  Pt has refused any additional antiplatelet medication beyond the aspirin.      Review of Systems    Constitutional: Patient denies any fever, chills, loss of appetite, or unexplained weight loss.  Cardiovascular: Patient denies any chest pain, shortness of breath with exertion, tachycardia, palpitations, orthopnea, or paroxysmal nocturnal dyspnea.  Respiratory: Patient denies any cough, shortness of breath, or wheezing.  Gastrointestinal: Patient denies any nausea, vomiting, diarrhea, constipation, melena, hematochezia, or reflux symptoms.    Skin: Denies any rashes.  He does have a right foot ulceration.    Neurology: Patient denies any new motor or sensory losses. Denies any numbness, tingling, weakness, " "and incoordination of the extremities. Patient also denies any tremor, seizures, or gait instability.  Endocrinology: Denies any polyuria, polydipsia, polyphagia, or heat/cold intolerance.    SEE HISTORY OF PRESENT ILLNESS ALSO    Objective   /71   Pulse 80   Temp 36.8 °C (98.2 °F)   Ht 1.727 m (5' 8\")   Wt 97.8 kg (215 lb 11.2 oz)   SpO2 93%   BMI 32.80 kg/m²     Physical Exam    Gen. appearance: Alert and cooperative, no acute distress, well-developed/well-nourished obese male.     Head: Normocephalic and atraumatic.  Neck: Supple and without adenopathy or rigidity. There is no JVD at 90° and no carotid bruits are noted.  Cardiovascular: Heart has a regular rate and rhythm without murmur or ectopy.  Respiratory: Lungs are clear to auscultation bilaterally with good air exchange.  Good respiratory effort no accessory muscle use.  Neurological exam: Alert and oriented x3, no tremor.  Psychiatric: Appropriate mood and affect, good insight and judgment, no delusions or thought disorders, no suicidal or homicidal ideation.  Skin: Good turgor, warm, moist and without rashes or lesions.    Extremities: No clubbing, cyanosis, or edema.  There is an extensive dressing applied to the left foot.  Dressing is dry and intact.  Examination of the left foot reveals past amputation of the second toe. The site is well healed. There is a dressing applied to the mid foot, as he still being treated for a small ulceration. No redness or swelling in the area.    Assessment/Plan     HTN:  BP is mildly elevated on exam today.  We will continue the current medication and further address if remains elevated.  Pt believes blood pressure is not consistently elevated and reports better home readings.  Declines any changes to his BP medications.     Hyperlipidemia: Stable.   Patient will continue with the current medication, Atorvastatin.   Dietary changes, exercise, and maintenance of healthy weight were discussed at length.   " "  Diabetes mellitus type 2 with neuropathy: Most recent A1c was 9.0% on 7/29/23 labs (up from 8.8% on 5/1/23 labs).  7/11/2022: We increased Lantus from 58 to 62 units daily. We increased Humalog from 21 to 23 units twice daily with meals.  10/11/22: We increased the dosing of his Humalog to 25 units twice a day with meals.  1/10/2023: Recommended continuous glucose monitor to pt. He stated he will consider this at his next visit in April.  Pt was advised to add 15 units of Humalog with his lunch.  5/2/2023: We will change the Humalog to 25/15/25 Units with meals. Continue Lantus at 70 Units once daily.  8/1/2023: Pt has once again declined continuous glucose monitor and will consider at next visit in November. Increased Lantus from 70 units to 75 units QD. Increased Humalog from 25/15/25 units to 27/27/27 units with meals.  Pt previously stopped metformin and refused to restart it as he believes it is \"not good\" for him.  Dietary changes, exercise, and maintenance of a healthy weight were discussed at length.  Patient was advised to have a diabetic eye exam annually.  Patient was also advised to check the feet on a daily basis.     Hx of stroke: He will continue on daily aspirin therapy.   He discontinued Plavix in 2016 as it made him feel \"drugged\" and he wishes to remain on ASA only.  He has refused any additional anticoagulation despite my advice and encouragement.     Vitamin D deficiency: Vitamin D was borderline at 35 on recent labs.   Continue the 4000 unit dose of Vit D daily.         Pt to continues to refuse colon CA screening.  PSA due 1/10/2024    By signing my name below, I, Ann Upton, attest that this documentation has been prepared under the direction and in the presence of Dr. Minaya.  All medical record entries made by the [facility] were at my direction and personally dictated by me. I have reviewed the chart and agree that the record accurately reflects my personal performance of " the history, physical exam, discussion and plan. (Dr. Minaya).

## 2023-08-14 DIAGNOSIS — E11.621 TYPE 2 DIABETES MELLITUS WITH FOOT ULCER, WITH LONG-TERM CURRENT USE OF INSULIN (MULTI): ICD-10-CM

## 2023-08-14 DIAGNOSIS — Z79.4 TYPE 2 DIABETES MELLITUS WITH FOOT ULCER, WITH LONG-TERM CURRENT USE OF INSULIN (MULTI): ICD-10-CM

## 2023-08-14 DIAGNOSIS — L97.509 TYPE 2 DIABETES MELLITUS WITH FOOT ULCER, WITH LONG-TERM CURRENT USE OF INSULIN (MULTI): ICD-10-CM

## 2023-08-14 RX ORDER — INSULIN LISPRO 100 [IU]/ML
INJECTION, SOLUTION INTRAVENOUS; SUBCUTANEOUS
Qty: 7 EACH | Refills: 0 | Status: SHIPPED | OUTPATIENT
Start: 2023-08-14 | End: 2023-09-11 | Stop reason: SDUPTHER

## 2023-08-16 DIAGNOSIS — I10 PRIMARY HYPERTENSION: ICD-10-CM

## 2023-08-16 RX ORDER — LISINOPRIL AND HYDROCHLOROTHIAZIDE 12.5; 2 MG/1; MG/1
2 TABLET ORAL DAILY
Qty: 180 TABLET | Refills: 0 | Status: SHIPPED | OUTPATIENT
Start: 2023-08-16 | End: 2023-10-31 | Stop reason: SDUPTHER

## 2023-09-09 DIAGNOSIS — E11.621 TYPE 2 DIABETES MELLITUS WITH FOOT ULCER, WITH LONG-TERM CURRENT USE OF INSULIN (MULTI): ICD-10-CM

## 2023-09-09 DIAGNOSIS — Z79.4 TYPE 2 DIABETES MELLITUS WITH FOOT ULCER, WITH LONG-TERM CURRENT USE OF INSULIN (MULTI): ICD-10-CM

## 2023-09-09 DIAGNOSIS — L97.509 TYPE 2 DIABETES MELLITUS WITH FOOT ULCER, WITH LONG-TERM CURRENT USE OF INSULIN (MULTI): ICD-10-CM

## 2023-09-11 DIAGNOSIS — E11.621 TYPE 2 DIABETES MELLITUS WITH FOOT ULCER, WITH LONG-TERM CURRENT USE OF INSULIN (MULTI): ICD-10-CM

## 2023-09-11 DIAGNOSIS — Z79.4 TYPE 2 DIABETES MELLITUS WITH FOOT ULCER, WITH LONG-TERM CURRENT USE OF INSULIN (MULTI): ICD-10-CM

## 2023-09-11 DIAGNOSIS — L97.509 TYPE 2 DIABETES MELLITUS WITH FOOT ULCER, WITH LONG-TERM CURRENT USE OF INSULIN (MULTI): ICD-10-CM

## 2023-09-11 RX ORDER — GLIMEPIRIDE 4 MG/1
8 TABLET ORAL
Qty: 180 TABLET | Refills: 0 | Status: SHIPPED | OUTPATIENT
Start: 2023-09-11 | End: 2023-12-11 | Stop reason: SDUPTHER

## 2023-09-11 RX ORDER — INSULIN LISPRO 100 [IU]/ML
INJECTION, SOLUTION INTRAVENOUS; SUBCUTANEOUS
Qty: 7 EACH | Refills: 0 | Status: SHIPPED | OUTPATIENT
Start: 2023-09-11 | End: 2023-10-09 | Stop reason: SDUPTHER

## 2023-09-11 RX ORDER — INSULIN GLARGINE 100 [IU]/ML
70 INJECTION, SOLUTION SUBCUTANEOUS DAILY
Qty: 21 ML | Refills: 0 | Status: SHIPPED | OUTPATIENT
Start: 2023-09-11 | End: 2023-10-09

## 2023-09-11 RX ORDER — INSULIN LISPRO 100 [IU]/ML
INJECTION, SOLUTION INTRAVENOUS; SUBCUTANEOUS
Qty: 21 ML | OUTPATIENT
Start: 2023-09-11

## 2023-09-13 DIAGNOSIS — L97.509 TYPE 2 DIABETES MELLITUS WITH FOOT ULCER, WITH LONG-TERM CURRENT USE OF INSULIN (MULTI): ICD-10-CM

## 2023-09-13 DIAGNOSIS — E11.621 TYPE 2 DIABETES MELLITUS WITH FOOT ULCER, WITH LONG-TERM CURRENT USE OF INSULIN (MULTI): ICD-10-CM

## 2023-09-13 DIAGNOSIS — Z79.4 TYPE 2 DIABETES MELLITUS WITH FOOT ULCER, WITH LONG-TERM CURRENT USE OF INSULIN (MULTI): ICD-10-CM

## 2023-09-13 RX ORDER — INSULIN LISPRO 100 [IU]/ML
INJECTION, SOLUTION INTRAVENOUS; SUBCUTANEOUS
Qty: 7 EACH | Refills: 0 | Status: CANCELLED | OUTPATIENT
Start: 2023-09-13

## 2023-10-11 ENCOUNTER — OFFICE VISIT (OUTPATIENT)
Dept: PRIMARY CARE | Facility: CLINIC | Age: 73
End: 2023-10-11
Payer: MEDICARE

## 2023-10-11 DIAGNOSIS — Z23 ENCOUNTER FOR IMMUNIZATION: ICD-10-CM

## 2023-10-11 PROCEDURE — 90662 IIV NO PRSV INCREASED AG IM: CPT | Performed by: FAMILY MEDICINE

## 2023-10-11 PROCEDURE — 1036F TOBACCO NON-USER: CPT | Performed by: FAMILY MEDICINE

## 2023-10-11 PROCEDURE — 1160F RVW MEDS BY RX/DR IN RCRD: CPT | Performed by: FAMILY MEDICINE

## 2023-10-11 PROCEDURE — 3052F HG A1C>EQUAL 8.0%<EQUAL 9.0%: CPT | Performed by: FAMILY MEDICINE

## 2023-10-11 PROCEDURE — 1159F MED LIST DOCD IN RCRD: CPT | Performed by: FAMILY MEDICINE

## 2023-10-11 PROCEDURE — G0008 ADMIN INFLUENZA VIRUS VAC: HCPCS | Performed by: FAMILY MEDICINE

## 2023-10-11 NOTE — PROGRESS NOTES
Subjective   Patient ID: Marco Antonio Gill is a 73 y.o. male who presents for No chief complaint on file..    HPI       Pt understands with verbalization that vaccines all have risks (to include: local reaction, systemic reaction). Pt has reviewed the VIS (Vaccine information sheet) and gives consent to have this vaccination despite the risks.      Review of Systems    Objective   There were no vitals taken for this visit.    Physical Exam    Assessment/Plan

## 2023-10-15 DIAGNOSIS — E78.2 MIXED HYPERLIPIDEMIA: ICD-10-CM

## 2023-10-17 RX ORDER — ATORVASTATIN CALCIUM 20 MG/1
20 TABLET, FILM COATED ORAL DAILY
Qty: 90 TABLET | Refills: 0 | Status: SHIPPED | OUTPATIENT
Start: 2023-10-17 | End: 2024-01-17 | Stop reason: SDUPTHER

## 2023-10-26 DIAGNOSIS — I10 PRIMARY HYPERTENSION: ICD-10-CM

## 2023-10-27 ENCOUNTER — LAB (OUTPATIENT)
Dept: LAB | Facility: LAB | Age: 73
End: 2023-10-27
Payer: MEDICARE

## 2023-10-27 DIAGNOSIS — E11.42 TYPE 2 DIABETES MELLITUS WITH DIABETIC POLYNEUROPATHY, WITH LONG-TERM CURRENT USE OF INSULIN (MULTI): ICD-10-CM

## 2023-10-27 DIAGNOSIS — Z79.4 TYPE 2 DIABETES MELLITUS WITH DIABETIC POLYNEUROPATHY, WITH LONG-TERM CURRENT USE OF INSULIN (MULTI): ICD-10-CM

## 2023-10-27 DIAGNOSIS — I10 PRIMARY HYPERTENSION: ICD-10-CM

## 2023-10-27 LAB
ANION GAP SERPL CALC-SCNC: 14 MMOL/L (ref 10–20)
BUN SERPL-MCNC: 30 MG/DL (ref 6–23)
CALCIUM SERPL-MCNC: 8.9 MG/DL (ref 8.6–10.3)
CHLORIDE SERPL-SCNC: 105 MMOL/L (ref 98–107)
CO2 SERPL-SCNC: 27 MMOL/L (ref 21–32)
CREAT SERPL-MCNC: 1.14 MG/DL (ref 0.5–1.3)
CREAT UR-MCNC: 115.3 MG/DL (ref 20–370)
EST. AVERAGE GLUCOSE BLD GHB EST-MCNC: 180 MG/DL
GFR SERPL CREATININE-BSD FRML MDRD: 67 ML/MIN/1.73M*2
GLUCOSE SERPL-MCNC: 179 MG/DL (ref 74–99)
HBA1C MFR BLD: 7.9 %
MICROALBUMIN UR-MCNC: 179.1 MG/L
MICROALBUMIN/CREAT UR: 155.3 UG/MG CREAT
POTASSIUM SERPL-SCNC: 4.3 MMOL/L (ref 3.5–5.3)
SODIUM SERPL-SCNC: 142 MMOL/L (ref 136–145)

## 2023-10-27 PROCEDURE — 83036 HEMOGLOBIN GLYCOSYLATED A1C: CPT

## 2023-10-27 PROCEDURE — 82570 ASSAY OF URINE CREATININE: CPT

## 2023-10-27 PROCEDURE — 80048 BASIC METABOLIC PNL TOTAL CA: CPT

## 2023-10-27 PROCEDURE — 36415 COLL VENOUS BLD VENIPUNCTURE: CPT

## 2023-10-27 PROCEDURE — 82043 UR ALBUMIN QUANTITATIVE: CPT

## 2023-10-29 RX ORDER — LISINOPRIL AND HYDROCHLOROTHIAZIDE 12.5; 2 MG/1; MG/1
2 TABLET ORAL DAILY
Qty: 180 TABLET | Refills: 0 | OUTPATIENT
Start: 2023-10-29

## 2023-10-30 ENCOUNTER — APPOINTMENT (OUTPATIENT)
Dept: PRIMARY CARE | Facility: CLINIC | Age: 73
End: 2023-10-30
Payer: MEDICARE

## 2023-10-31 ENCOUNTER — OFFICE VISIT (OUTPATIENT)
Dept: PRIMARY CARE | Facility: CLINIC | Age: 73
End: 2023-10-31
Payer: MEDICARE

## 2023-10-31 VITALS
OXYGEN SATURATION: 95 % | HEART RATE: 78 BPM | BODY MASS INDEX: 33.75 KG/M2 | HEIGHT: 68 IN | TEMPERATURE: 98.2 F | DIASTOLIC BLOOD PRESSURE: 64 MMHG | SYSTOLIC BLOOD PRESSURE: 138 MMHG | WEIGHT: 222.7 LBS

## 2023-10-31 DIAGNOSIS — E78.2 MIXED HYPERLIPIDEMIA: ICD-10-CM

## 2023-10-31 DIAGNOSIS — E11.42 TYPE 2 DIABETES MELLITUS WITH DIABETIC POLYNEUROPATHY, WITH LONG-TERM CURRENT USE OF INSULIN (MULTI): ICD-10-CM

## 2023-10-31 DIAGNOSIS — E55.9 VITAMIN D DEFICIENCY: ICD-10-CM

## 2023-10-31 DIAGNOSIS — Z86.73 HISTORY OF STROKE: ICD-10-CM

## 2023-10-31 DIAGNOSIS — I10 PRIMARY HYPERTENSION: Primary | ICD-10-CM

## 2023-10-31 DIAGNOSIS — Z79.4 TYPE 2 DIABETES MELLITUS WITH DIABETIC POLYNEUROPATHY, WITH LONG-TERM CURRENT USE OF INSULIN (MULTI): ICD-10-CM

## 2023-10-31 PROBLEM — R78.81 BACTEREMIA: Status: RESOLVED | Noted: 2023-04-06 | Resolved: 2023-10-31

## 2023-10-31 PROBLEM — M86.072 ACUTE HEMATOGENOUS OSTEOMYELITIS OF LEFT FOOT (MULTI): Status: RESOLVED | Noted: 2023-04-06 | Resolved: 2023-10-31

## 2023-10-31 PROCEDURE — 1159F MED LIST DOCD IN RCRD: CPT | Performed by: FAMILY MEDICINE

## 2023-10-31 PROCEDURE — 3075F SYST BP GE 130 - 139MM HG: CPT | Performed by: FAMILY MEDICINE

## 2023-10-31 PROCEDURE — 3078F DIAST BP <80 MM HG: CPT | Performed by: FAMILY MEDICINE

## 2023-10-31 PROCEDURE — 3060F POS MICROALBUMINURIA REV: CPT | Performed by: FAMILY MEDICINE

## 2023-10-31 PROCEDURE — 1036F TOBACCO NON-USER: CPT | Performed by: FAMILY MEDICINE

## 2023-10-31 PROCEDURE — 1160F RVW MEDS BY RX/DR IN RCRD: CPT | Performed by: FAMILY MEDICINE

## 2023-10-31 PROCEDURE — 3051F HG A1C>EQUAL 7.0%<8.0%: CPT | Performed by: FAMILY MEDICINE

## 2023-10-31 PROCEDURE — 99214 OFFICE O/P EST MOD 30 MIN: CPT | Performed by: FAMILY MEDICINE

## 2023-10-31 RX ORDER — INSULIN LISPRO 100 [IU]/ML
INJECTION, SOLUTION INTRAVENOUS; SUBCUTANEOUS
Qty: 9 EACH | Refills: 2 | Status: SHIPPED | OUTPATIENT
Start: 2023-10-31 | End: 2023-12-11 | Stop reason: SDUPTHER

## 2023-10-31 RX ORDER — LISINOPRIL AND HYDROCHLOROTHIAZIDE 12.5; 2 MG/1; MG/1
2 TABLET ORAL DAILY
Qty: 180 TABLET | Refills: 0 | Status: SHIPPED | OUTPATIENT
Start: 2023-10-31 | End: 2024-02-12 | Stop reason: SDUPTHER

## 2023-10-31 RX ORDER — INSULIN GLARGINE 100 [IU]/ML
INJECTION, SOLUTION SUBCUTANEOUS
Qty: 24 ML | Refills: 0
Start: 2023-10-31 | End: 2023-11-08 | Stop reason: SDUPTHER

## 2023-10-31 ASSESSMENT — PATIENT HEALTH QUESTIONNAIRE - PHQ9
SUM OF ALL RESPONSES TO PHQ9 QUESTIONS 1 AND 2: 0
2. FEELING DOWN, DEPRESSED OR HOPELESS: NOT AT ALL
1. LITTLE INTEREST OR PLEASURE IN DOING THINGS: NOT AT ALL

## 2023-10-31 NOTE — PROGRESS NOTES
"Subjective   Patient ID: Marco Antonio Gill is a 73 y.o. male who presents for Follow-up.    HPI       No new concerns   Labs: 10/27/2023      He has hypertension.  Pt does monitor his BP.  He is compliant with his antihypertensive therapy denies any side effects.   He denies CP, SOB, and dizziness.      Patient has hyperlipidemia.  He is treated with Atorvastatin.   Pt tries to limit the amount of fatty foods and high cholesterol foods that he consumes  Patient is compliant with his atorvastatin therapy and denies any noted side effects.      Pt has Type 2 diabetes.   He monitors his blood glucose and keeps a log of his readings.  Patient does try to limit the amount of carbohydrates that are consumed.  He denies any hypoglycemic symptoms or readings.   Also denies any polyuria, any tingling in the extremities.        He has a right foot ulceration that he states is almost healed.  Currently being treated with hyperbaric oxygen treatment.  The site of amputation of his second toe on the left foot has healed.           He had a stroke in 2016 and is currently taking aspirin for prophylaxis.   He was previously treated with Plavix, but discontinued it on his own in September 2016 as he felt that it made him feel \"drugged.\"  Pt has refused any additional antiplatelet medication beyond the aspirin.           Review of Systems  Constitutional: Patient denies any fever, chills, loss of appetite, or unexplained weight loss.  Cardiovascular: Patient denies any chest pain, shortness of breath with exertion, tachycardia, palpitations, orthopnea, or paroxysmal nocturnal dyspnea.  Respiratory: Patient denies any cough, shortness breath, or wheezing.  Gastrointestinal: Patient denies any nausea, vomiting, diarrhea, constipation, melena, hematochezia, or reflux symptoms.  Skin: Denies any rashes or skin lesions.   Neurology: Patient denies any new motor or sensory losses.  Denies any numbness, tingling, weakness, and incoordination " "of the extremities.  Patient also denies any tremor, seizures, or gait instability.  Endocrinology: Denies any polyuria, polydipsia, polyphagia, or heat/cold intolerance.    Objective   /64   Pulse 78   Temp 36.8 °C (98.2 °F)   Ht 1.727 m (5' 8\")   Wt 101 kg (222 lb 11.2 oz)   SpO2 95%   BMI 33.86 kg/m²     Physical Exam  General Appearance: Alert and cooperative, in no acute distress, well-developed/well-nourished male.  Neck: Supple and without adenopathy or rigidity.  There is no JVD at 90° and no carotid bruits are noted.  There is no thyromegaly, thyroid tenderness, or palpable thyroid nodules.    Heart: Regular rate and rhythm with a grade 1/6 murmur at RSB and no noted ectopy.    Respiratory: Lungs are clear to auscultation bilaterally with good air exchange.  Good respiratory effort and no accessory muscle use.  Skin: Good turgor, moist, warm and without rashes or lesions.  Neurological exam: Alert and oriented ×3, no tremor, normal gait.  Extremities: No clubbing, cyanosis, or edema    Assessment/Plan     HTN:  BP is above goal.  We will continue the current medication and further address if remains elevated.  Pt consistently reports better home readings.  Declines any changes to his BP medications.     Hyperlipidemia: Stable.   Patient will continue with the current medication, Atorvastatin.   Dietary changes, exercise, and maintenance of healthy weight were discussed at length.     Diabetes mellitus type 2 with neuropathy: Most recent A1c was 9.0% on 7/29/23 labs (up from 8.8% on 5/1/23 labs).  7/11/2022: We increased Lantus from 58 to 62 units daily. We increased Humalog from 21 to 23 units twice daily with meals.  10/11/22: We increased the dosing of his Humalog to 25 units twice a day with meals.  1/10/2023: Recommended continuous glucose monitor to pt. He stated he will consider this at his next visit in April.  Pt was advised to add 15 units of Humalog with his lunch.  5/2/2023: We will " "change the Humalog to 25/15/25 Units with meals. Continue Lantus at 70 Units once daily.  2023: Pt has once again declined continuous glucose monitor and will consider at next visit in November. Increased Lantus from 70 units to 75 units QD. Increased Humalog from 25/15/25 units to 27/27/27 units with meals.  Pt previously stopped metformin and refused to restart it as he believes it is \"not good\" for him.  Dietary changes, exercise, and maintenance of a healthy weight were discussed at length.  Patient was advised to have a diabetic eye exam annually.  Patient was also advised to check the feet on a daily basis.  10/31/2023:  Changed Humalog to 27/28/27 units with meals.    Hx of stroke: He will continue on daily aspirin therapy.   He discontinued Plavix in  as it made him feel \"drugged\" and he wishes to remain on ASA only.  He has refused any additional anticoagulation despite my advice and encouragement.     Vitamin D deficiency: Stable based on labs.  Continue the current vitamin D supplementation.      Pt to continues to refuse colon CA screening.  PSA due 1/10/2024        Orders Placed This Encounter   Procedures    Hemoglobin A1C    Lipid Panel    Comprehensive Metabolic Panel    Vitamin D 25-Hydroxy,Total (for eval of Vitamin D levels)     Requested Prescriptions     Signed Prescriptions Disp Refills    insulin lispro (HumaLOG KwikPen Insulin) 100 unit/mL injection 9 each 2     Si units subcutaneously before breakfast, 29 units before lunch, and 27 units before evening meal    lisinopriL-hydrochlorothiazide 20-12.5 mg tablet 180 tablet 0     Sig: Take 2 tablets by mouth once daily.       "

## 2023-10-31 NOTE — PATIENT INSTRUCTIONS
Change the Humalog to 27/29/27 with meals as discussed.    Follow up in 3 months with labs to be done PRIOR.    It was a pleasure to see you today. Thank you for choosing us for your health care needs.    If you have lab or other testing completed and have not been informed of results within one week, please call the office for your results.    If you haven't done so, consider signing up for [facility] [employer], the [facility] personal health record. Ask the staff how you can get started.

## 2023-11-08 DIAGNOSIS — E11.621 TYPE 2 DIABETES MELLITUS WITH FOOT ULCER, WITH LONG-TERM CURRENT USE OF INSULIN (MULTI): ICD-10-CM

## 2023-11-08 DIAGNOSIS — Z79.4 TYPE 2 DIABETES MELLITUS WITH FOOT ULCER, WITH LONG-TERM CURRENT USE OF INSULIN (MULTI): ICD-10-CM

## 2023-11-08 DIAGNOSIS — L97.509 TYPE 2 DIABETES MELLITUS WITH FOOT ULCER, WITH LONG-TERM CURRENT USE OF INSULIN (MULTI): ICD-10-CM

## 2023-11-08 RX ORDER — INSULIN GLARGINE 100 [IU]/ML
INJECTION, SOLUTION SUBCUTANEOUS
Qty: 24 ML | Refills: 0 | Status: SHIPPED | OUTPATIENT
Start: 2023-11-08 | End: 2023-12-11 | Stop reason: SDUPTHER

## 2023-12-02 DIAGNOSIS — E11.621 TYPE 2 DIABETES MELLITUS WITH FOOT ULCER, WITH LONG-TERM CURRENT USE OF INSULIN (MULTI): ICD-10-CM

## 2023-12-02 DIAGNOSIS — L97.509 TYPE 2 DIABETES MELLITUS WITH FOOT ULCER, WITH LONG-TERM CURRENT USE OF INSULIN (MULTI): ICD-10-CM

## 2023-12-02 DIAGNOSIS — E11.42 TYPE 2 DIABETES MELLITUS WITH DIABETIC POLYNEUROPATHY, WITH LONG-TERM CURRENT USE OF INSULIN (MULTI): ICD-10-CM

## 2023-12-02 DIAGNOSIS — Z79.4 TYPE 2 DIABETES MELLITUS WITH DIABETIC POLYNEUROPATHY, WITH LONG-TERM CURRENT USE OF INSULIN (MULTI): ICD-10-CM

## 2023-12-02 DIAGNOSIS — Z79.4 TYPE 2 DIABETES MELLITUS WITH FOOT ULCER, WITH LONG-TERM CURRENT USE OF INSULIN (MULTI): ICD-10-CM

## 2023-12-04 RX ORDER — GLIMEPIRIDE 4 MG/1
TABLET ORAL
Qty: 180 TABLET | Refills: 0 | OUTPATIENT
Start: 2023-12-04

## 2023-12-04 NOTE — TELEPHONE ENCOUNTER
Medication refused due to failing protocol.    Requested Prescriptions   Pending Prescriptions Disp Refills    glimepiride (Amaryl) 4 mg tablet [Pharmacy Med Name: GLIMEPIRIDE 4MG TABLETS] 180 tablet 0     Sig: TAKE 2 TABLETS BY MOUTH ONCE DAILY IN THE MORNING. TAKE BEFORE MEALS       Antihyperglycemics Protocol Passed - 12/2/2023  1:17 PM        Passed - Normal creatinine in past year     Creatinine   Date Value Ref Range Status   10/27/2023 1.14 0.50 - 1.30 mg/dL Final     Creatinine, Urine Random   Date Value Ref Range Status   10/27/2023 115.3 20.0 - 370.0 mg/dL Final             Passed - Visit with relevant provider in past 12 months or upcoming 90 days     Recent Visits  Date Type Provider Dept   10/31/23 Office Visit Rm Minaya, DO Do [id number]   [id number] Do [id number]   08/01/23 Office Visit Rm Minaya, DO Do [id number]   05/02/23 Office Visit Rm Minaya, DO Do [id number]   Showing recent visits within past 365 days and meeting all other requirements  Future Appointments  Date Type Provider Dept   01/31/24 Appointment Rm Minaya,  Do [id number]   Showing future appointments within next 90 days and meeting all other requirements              Passed - Medication not refilled in past 45 days (1.5 months)     No matching medication orders between 10/20/2023  1:35 PM and 12/4/2023  1:35 PM

## 2023-12-11 RX ORDER — INSULIN LISPRO 100 [IU]/ML
INJECTION, SOLUTION INTRAVENOUS; SUBCUTANEOUS
Qty: 9 EACH | Refills: 0 | Status: SHIPPED | OUTPATIENT
Start: 2023-12-11 | End: 2024-01-12

## 2023-12-11 RX ORDER — INSULIN GLARGINE 100 [IU]/ML
INJECTION, SOLUTION SUBCUTANEOUS
Qty: 24 ML | Refills: 0 | Status: SHIPPED | OUTPATIENT
Start: 2023-12-11 | End: 2024-01-17

## 2023-12-11 RX ORDER — GLIMEPIRIDE 4 MG/1
8 TABLET ORAL
Qty: 180 TABLET | Refills: 0 | Status: SHIPPED | OUTPATIENT
Start: 2023-12-11 | End: 2024-03-14 | Stop reason: SDUPTHER

## 2023-12-14 RX ORDER — IBUPROFEN 200 MG
CAPSULE ORAL
Qty: 100 STRIP | Refills: 3 | Status: SHIPPED | OUTPATIENT
Start: 2023-12-14 | End: 2024-04-17 | Stop reason: SDUPTHER

## 2024-01-09 DIAGNOSIS — Z79.4 TYPE 2 DIABETES MELLITUS WITH DIABETIC POLYNEUROPATHY, WITH LONG-TERM CURRENT USE OF INSULIN (MULTI): ICD-10-CM

## 2024-01-09 DIAGNOSIS — E11.42 TYPE 2 DIABETES MELLITUS WITH DIABETIC POLYNEUROPATHY, WITH LONG-TERM CURRENT USE OF INSULIN (MULTI): ICD-10-CM

## 2024-01-12 RX ORDER — INSULIN LISPRO 100 [IU]/ML
INJECTION, SOLUTION INTRAVENOUS; SUBCUTANEOUS
Qty: 27 ML | Refills: 0 | Status: SHIPPED | OUTPATIENT
Start: 2024-01-12 | End: 2024-01-31 | Stop reason: SDUPTHER

## 2024-01-30 ENCOUNTER — LAB (OUTPATIENT)
Dept: LAB | Facility: LAB | Age: 74
End: 2024-01-30
Payer: MEDICARE

## 2024-01-30 DIAGNOSIS — E11.42 TYPE 2 DIABETES MELLITUS WITH DIABETIC POLYNEUROPATHY, WITH LONG-TERM CURRENT USE OF INSULIN (MULTI): ICD-10-CM

## 2024-01-30 DIAGNOSIS — E78.2 MIXED HYPERLIPIDEMIA: ICD-10-CM

## 2024-01-30 DIAGNOSIS — Z79.4 TYPE 2 DIABETES MELLITUS WITH DIABETIC POLYNEUROPATHY, WITH LONG-TERM CURRENT USE OF INSULIN (MULTI): ICD-10-CM

## 2024-01-30 DIAGNOSIS — I10 PRIMARY HYPERTENSION: ICD-10-CM

## 2024-01-30 DIAGNOSIS — E55.9 VITAMIN D DEFICIENCY: ICD-10-CM

## 2024-01-30 LAB
25(OH)D3 SERPL-MCNC: 29 NG/ML (ref 30–100)
ALBUMIN SERPL BCP-MCNC: 3.7 G/DL (ref 3.4–5)
ALP SERPL-CCNC: 54 U/L (ref 33–136)
ALT SERPL W P-5'-P-CCNC: 41 U/L (ref 10–52)
ANION GAP SERPL CALC-SCNC: 11 MMOL/L (ref 10–20)
AST SERPL W P-5'-P-CCNC: 25 U/L (ref 9–39)
BILIRUB SERPL-MCNC: 1 MG/DL (ref 0–1.2)
BUN SERPL-MCNC: 30 MG/DL (ref 6–23)
CALCIUM SERPL-MCNC: 8.7 MG/DL (ref 8.6–10.3)
CHLORIDE SERPL-SCNC: 106 MMOL/L (ref 98–107)
CHOLEST SERPL-MCNC: 122 MG/DL (ref 0–199)
CHOLESTEROL/HDL RATIO: 2.6
CO2 SERPL-SCNC: 27 MMOL/L (ref 21–32)
CREAT SERPL-MCNC: 1.18 MG/DL (ref 0.5–1.3)
EGFRCR SERPLBLD CKD-EPI 2021: 64 ML/MIN/1.73M*2
EST. AVERAGE GLUCOSE BLD GHB EST-MCNC: 209 MG/DL
GLUCOSE SERPL-MCNC: 145 MG/DL (ref 74–99)
HBA1C MFR BLD: 8.9 %
HDLC SERPL-MCNC: 47.7 MG/DL
LDLC SERPL CALC-MCNC: 44 MG/DL
NON HDL CHOLESTEROL: 74 MG/DL (ref 0–149)
POTASSIUM SERPL-SCNC: 3.9 MMOL/L (ref 3.5–5.3)
PROT SERPL-MCNC: 6.5 G/DL (ref 6.4–8.2)
SODIUM SERPL-SCNC: 140 MMOL/L (ref 136–145)
TRIGL SERPL-MCNC: 151 MG/DL (ref 0–149)
VLDL: 30 MG/DL (ref 0–40)

## 2024-01-30 PROCEDURE — 80053 COMPREHEN METABOLIC PANEL: CPT

## 2024-01-30 PROCEDURE — 83036 HEMOGLOBIN GLYCOSYLATED A1C: CPT

## 2024-01-30 PROCEDURE — 82306 VITAMIN D 25 HYDROXY: CPT

## 2024-01-30 PROCEDURE — 36415 COLL VENOUS BLD VENIPUNCTURE: CPT

## 2024-01-30 PROCEDURE — 80061 LIPID PANEL: CPT

## 2024-01-31 ENCOUNTER — OFFICE VISIT (OUTPATIENT)
Dept: PRIMARY CARE | Facility: CLINIC | Age: 74
End: 2024-01-31
Payer: MEDICARE

## 2024-01-31 VITALS
DIASTOLIC BLOOD PRESSURE: 76 MMHG | SYSTOLIC BLOOD PRESSURE: 152 MMHG | HEIGHT: 68 IN | WEIGHT: 219 LBS | HEART RATE: 70 BPM | TEMPERATURE: 97.9 F | OXYGEN SATURATION: 92 % | BODY MASS INDEX: 33.19 KG/M2

## 2024-01-31 DIAGNOSIS — I10 PRIMARY HYPERTENSION: ICD-10-CM

## 2024-01-31 DIAGNOSIS — E11.621 TYPE 2 DIABETES MELLITUS WITH FOOT ULCER, WITH LONG-TERM CURRENT USE OF INSULIN (MULTI): ICD-10-CM

## 2024-01-31 DIAGNOSIS — E11.42 TYPE 2 DIABETES MELLITUS WITH DIABETIC POLYNEUROPATHY, WITH LONG-TERM CURRENT USE OF INSULIN (MULTI): ICD-10-CM

## 2024-01-31 DIAGNOSIS — Z86.73 HISTORY OF STROKE: ICD-10-CM

## 2024-01-31 DIAGNOSIS — L98.9 SCALP LESION: ICD-10-CM

## 2024-01-31 DIAGNOSIS — Z79.4 TYPE 2 DIABETES MELLITUS WITH DIABETIC POLYNEUROPATHY, WITH LONG-TERM CURRENT USE OF INSULIN (MULTI): ICD-10-CM

## 2024-01-31 DIAGNOSIS — E66.9 CLASS 1 OBESITY WITH SERIOUS COMORBIDITY AND BODY MASS INDEX (BMI) OF 33.0 TO 33.9 IN ADULT, UNSPECIFIED OBESITY TYPE: ICD-10-CM

## 2024-01-31 DIAGNOSIS — Z00.00 WELL ADULT EXAM: ICD-10-CM

## 2024-01-31 DIAGNOSIS — Z00.00 MEDICARE ANNUAL WELLNESS VISIT, SUBSEQUENT: Primary | ICD-10-CM

## 2024-01-31 DIAGNOSIS — L97.509 TYPE 2 DIABETES MELLITUS WITH FOOT ULCER, WITH LONG-TERM CURRENT USE OF INSULIN (MULTI): ICD-10-CM

## 2024-01-31 DIAGNOSIS — Z79.4 TYPE 2 DIABETES MELLITUS WITH FOOT ULCER, WITH LONG-TERM CURRENT USE OF INSULIN (MULTI): ICD-10-CM

## 2024-01-31 DIAGNOSIS — E78.2 MIXED HYPERLIPIDEMIA: ICD-10-CM

## 2024-01-31 DIAGNOSIS — E55.9 VITAMIN D DEFICIENCY: ICD-10-CM

## 2024-01-31 PROCEDURE — 1170F FXNL STATUS ASSESSED: CPT | Performed by: FAMILY MEDICINE

## 2024-01-31 PROCEDURE — 3052F HG A1C>EQUAL 8.0%<EQUAL 9.0%: CPT | Performed by: FAMILY MEDICINE

## 2024-01-31 PROCEDURE — G0439 PPPS, SUBSEQ VISIT: HCPCS | Performed by: FAMILY MEDICINE

## 2024-01-31 PROCEDURE — 3078F DIAST BP <80 MM HG: CPT | Performed by: FAMILY MEDICINE

## 2024-01-31 PROCEDURE — 1159F MED LIST DOCD IN RCRD: CPT | Performed by: FAMILY MEDICINE

## 2024-01-31 PROCEDURE — 3077F SYST BP >= 140 MM HG: CPT | Performed by: FAMILY MEDICINE

## 2024-01-31 PROCEDURE — 3048F LDL-C <100 MG/DL: CPT | Performed by: FAMILY MEDICINE

## 2024-01-31 PROCEDURE — 1160F RVW MEDS BY RX/DR IN RCRD: CPT | Performed by: FAMILY MEDICINE

## 2024-01-31 PROCEDURE — 1036F TOBACCO NON-USER: CPT | Performed by: FAMILY MEDICINE

## 2024-01-31 PROCEDURE — 99397 PER PM REEVAL EST PAT 65+ YR: CPT | Performed by: FAMILY MEDICINE

## 2024-01-31 PROCEDURE — 99214 OFFICE O/P EST MOD 30 MIN: CPT | Performed by: FAMILY MEDICINE

## 2024-01-31 RX ORDER — INSULIN GLARGINE 100 [IU]/ML
INJECTION, SOLUTION SUBCUTANEOUS
Qty: 69 ML | Refills: 0 | Status: SHIPPED | OUTPATIENT
Start: 2024-01-31 | End: 2024-02-22 | Stop reason: SDUPTHER

## 2024-01-31 RX ORDER — CHOLECALCIFEROL (VITAMIN D3) 50 MCG
TABLET ORAL
Status: SHIPPED
Start: 2024-01-31

## 2024-01-31 RX ORDER — INSULIN LISPRO 100 [IU]/ML
INJECTION, SOLUTION INTRAVENOUS; SUBCUTANEOUS
Qty: 84 ML | Refills: 0 | Status: SHIPPED | OUTPATIENT
Start: 2024-01-31 | End: 2024-05-07 | Stop reason: SDUPTHER

## 2024-01-31 ASSESSMENT — ACTIVITIES OF DAILY LIVING (ADL)
DRESSING: INDEPENDENT
TAKING_MEDICATION: INDEPENDENT
DOING_HOUSEWORK: INDEPENDENT
MANAGING_FINANCES: INDEPENDENT
BATHING: INDEPENDENT
GROCERY_SHOPPING: INDEPENDENT

## 2024-01-31 NOTE — PROGRESS NOTES
"Subjective   Reason for Visit: Marco Antonio Gill is an 73 y.o. male here for a Medicare Wellness visit, annual physical: 3-month follow-up monitoring management of multiple medical conditions.          Reviewed all medications by prescribing practitioner or clinical pharmacist (such as prescriptions, OTCs, herbal therapies and supplements) and documented in the medical record.    HPI      Patient reports the previously noted wound to his left foot is healing but not completely healed up yet.  He continues to follow with podiatry, Dr. Thrasher.    No other concerns     He has hypertension.  Pt does monitor his BP.  He is compliant with his antihypertensive therapy denies any side effects.   He denies CP, SOB, and dizziness.      Patient has hyperlipidemia.  He is treated with Atorvastatin.   Pt tries to limit the amount of fatty foods and high cholesterol foods that he consumes  Patient is compliant with his atorvastatin therapy and denies any noted side effects.      Pt has Type 2 diabetes.   He monitors his blood glucose and keeps a log of his readings.  Patient does try to limit the amount of carbohydrates that are consumed.  He denies any hypoglycemic symptoms or readings.  Also denies any polyuria, any tingling in the extremities.     He had a stroke in 2016 and is currently taking aspirin for prophylaxis.   He was previously treated with Plavix, but discontinued it on his own in September 2016 as he felt that it made him feel \"drugged.\"  Pt has refused any additional antiplatelet medication beyond the aspirin.     Patient Self Assessment of Health Status  Patient Self Assessment: Good    Nutrition and Exercise  Current Diet: Well Balanced Diet, Unhealthy Diet  Adequate Fluid Intake: No  Caffeine: Yes  Exercise Frequency: Infrequently    Functional Ability/Level of Safety  Cognitive Impairment Observed: No cognitive impairment observed  Cognitive Impairment Reported: No cognitive impairment reported by patient or " "family    Home Safety Risk Factors: None    Patient Care Team:  Rm Mianya DO as PCP - General  Rm Minaya DO as PCP - [employer] PCP     Review of Systems  Constitutional: Patient denies any fever, chills, loss of appetite, or unexplained weight loss.  Cardiovascular: Patient denies any chest pain, shortness of breath with exertion, tachycardia, palpitations, orthopnea, or paroxysmal nocturnal dyspnea.  Respiratory: Patient denies any cough, shortness breath, or wheezing.  Skin: Denies any rashes or skin lesions.   Neurology: Patient denies any new motor or sensory losses.  Denies any numbness, tingling, weakness, and incoordination of the extremities.  Patient also denies any tremor, seizures, or gait instability.  Endocrinology: Denies any polyuria, polydipsia, polyphagia, or heat/cold intolerance.      Objective   Vitals:  /76   Pulse 70   Temp 36.6 °C (97.9 °F)   Ht 1.727 m (5' 8\")   Wt 99.3 kg (219 lb)   SpO2 92%   BMI 33.30 kg/m²       Physical Exam  General Appearance: Alert and cooperative, in no acute distress, well-developed/well-nourished.  Neck: Supple and without adenopathy or rigidity.  There is no JVD at 90° and no carotid bruits are noted.  There is no thyromegaly, thyroid tenderness, or palpable thyroid nodules.  Heart: Regular rate and rhythm without murmur or ectopy.  Respiratory: Lungs are clear to auscultation bilaterally with good air exchange.  Good respiratory effort and no accessory muscle use.    Skin: Good turgor, moist, warm and without rashes.  There are 2 hyperpigmented, slightly raised skin lesions to the right scalp (1.5 cm and 3 cm in diam).  The smaller lesion has a somewhat irregular border and variation in color.  The other has a smooth border and a consistent color.    Neurological exam: Alert and oriented ×3, no tremor, normal gait.  Extremities: No clubbing, cyanosis, or edema    Head: Normocephalic and atraumatic  Eyes: Conjunctiva and " sclera appear normal bilaterally.  Anterior chambers appear clear.  Extraocular muscles are intact.  ENT: Mucous membranes are moist, external auditory canals and tympanic membranes are within normal limits bilaterally.  Pharynx is without erythema or exudate.  There is no noted rhinorrhea.  Lymph:  No noted cervical, clavicular, axillary, or inguinal adenopathy.  Abdomen: Soft, nontender/nondistended.  No masses, guarding, rebound, or rigidity.  Bowel sounds are normal.  No abdominal bruits.  No CVA tenderness.  There is no widening of the aortic pulsation.  Musculoskeletal: No noted joint effusions or gross bony deformity.        Assessment/Plan   MEDICARE ANNUAL WELLNESS EXAM / ANNUAL PHYSICAL: Appropriate screenings for the patient's current age were discussed at length.  I encouraged a low-fat/low-cholesterol diet and routine exercise.          HTN:  BP is above goal.  We will continue the current medication and further address if remains elevated.  Pt consistently reports better home readings.  Declines any changes to his BP medications.     Hyperlipidemia: Stable.   Patient will continue with the current medication, Atorvastatin.   Dietary changes, exercise, and maintenance of healthy weight were discussed at length.     Diabetes mellitus type 2 with neuropathy: Most recent A1c was:  Lab Results   Component Value Date    HGBA1C 8.9 (H) 01/30/2024   Previous A1c was 7.9% on 10/27/2023    7/11/2022: We increased Lantus from 58 to 62 units daily. We increased Humalog from 21 to 23 units twice daily with meals.  10/11/22: We increased the dosing of his Humalog to 25 units twice a day with meals.  1/10/2023: Recommended continuous glucose monitor to pt. He stated he will consider this at his next visit in April.  Pt was advised to add 15 units of Humalog with his lunch.  5/2/2023: We will change the Humalog to 25/15/25 Units with meals. Continue Lantus at 70 Units once daily.  8/1/2023: Pt has once again declined  "continuous glucose monitor and will consider at next visit in November. Increased Lantus from 70 units to 75 units QD. Increased Humalog from 25/15/25 units to 27/27/27 units with meals.  Pt previously stopped metformin and refused to restart it as he believes it is \"not good\" for him.  Dietary changes, exercise, and maintenance of a healthy weight were discussed at length.  Patient was advised to have a diabetic eye exam annually.  Patient was also advised to check the feet on a daily basis.  10/31/2023:  Changed Humalog to 27/28/27 units with meals.  1/31/2024: Will change Humalog to 32/29/32 units with meals     Hx of stroke:   He will continue on daily aspirin therapy.   He discontinued Plavix in 2016 as it made him feel \"drugged\" and he wishes to remain on ASA only.  He has refused any additional anticoagulation despite my advice and encouragement.     Vitamin D deficiency:   1/31/2024: He will increase the vitamin D.  Pt will call with dose he is taking.     Obesity:  Diet and exercise discussed.  BMI goal of 25.     Skin lesion on scalp:  Has a raised hyperpigmented lesion on right scalp that has some variation of in color and minor irregular borders.  Refer to dermatology.  Information given.      Pt to continues to refuse colon CA screening.  PSA due 7/30/2024                Orders Placed This Encounter   Procedures    Basic Metabolic Panel    Hemoglobin A1C    Albumin , Urine Random    Referral to Dermatology     Requested Prescriptions     Signed Prescriptions Disp Refills    cholecalciferol (Vitamin D-3) 50 MCG (2000 UT) tablet       Sig: Take 6000 units once day    insulin glargine (Lantus Solostar U-100 Insulin) 100 unit/mL (3 mL) pen 69 mL 0     Sig: ADMINISTER 75 UNITS UNDER THE SKIN EVERY DAY    insulin lispro (HumaLOG) 100 unit/mL injection 84 mL 0     Sig: INJECT 32 UNITS BEFORE BREAKFAST, 29 UNITS BEFORE LUNCH, AND 32 UNITS BEFORE EVENING MEAL       "

## 2024-01-31 NOTE — PATIENT INSTRUCTIONS
RSV vaccine recommended.    Change the Humalog to: 32 UNITS BEFORE BREAKFAST, 29 UNITS BEFORE LUNCH, AND 32 UNITS BEFORE EVENING MEAL.    Schedule an appointment with dermatology as we discussed.    Follow up in 3 months with labs to be done PRIOR.    It was a pleasure to see you today. Thank you for choosing us for your health care needs.    If you have lab or other testing completed and have not been informed of results within one week, please call the office for your results.    If you haven't done so, consider signing up for [email or web address], the [facility] personal health record. Ask the staff how you can get started.

## 2024-02-12 DIAGNOSIS — I10 PRIMARY HYPERTENSION: ICD-10-CM

## 2024-02-12 RX ORDER — LISINOPRIL AND HYDROCHLOROTHIAZIDE 12.5; 2 MG/1; MG/1
2 TABLET ORAL DAILY
Qty: 180 TABLET | Refills: 0 | Status: SHIPPED | OUTPATIENT
Start: 2024-02-12 | End: 2024-05-14 | Stop reason: SDUPTHER

## 2024-02-22 DIAGNOSIS — L97.509 TYPE 2 DIABETES MELLITUS WITH FOOT ULCER, WITH LONG-TERM CURRENT USE OF INSULIN (MULTI): ICD-10-CM

## 2024-02-22 DIAGNOSIS — Z79.4 TYPE 2 DIABETES MELLITUS WITH FOOT ULCER, WITH LONG-TERM CURRENT USE OF INSULIN (MULTI): ICD-10-CM

## 2024-02-22 DIAGNOSIS — E11.621 TYPE 2 DIABETES MELLITUS WITH FOOT ULCER, WITH LONG-TERM CURRENT USE OF INSULIN (MULTI): ICD-10-CM

## 2024-02-26 DIAGNOSIS — L97.509 TYPE 2 DIABETES MELLITUS WITH FOOT ULCER, WITH LONG-TERM CURRENT USE OF INSULIN (MULTI): ICD-10-CM

## 2024-02-26 DIAGNOSIS — Z79.4 TYPE 2 DIABETES MELLITUS WITH FOOT ULCER, WITH LONG-TERM CURRENT USE OF INSULIN (MULTI): ICD-10-CM

## 2024-02-26 DIAGNOSIS — E11.621 TYPE 2 DIABETES MELLITUS WITH FOOT ULCER, WITH LONG-TERM CURRENT USE OF INSULIN (MULTI): ICD-10-CM

## 2024-02-26 RX ORDER — INSULIN GLARGINE 100 [IU]/ML
INJECTION, SOLUTION SUBCUTANEOUS
Qty: 69 ML | Refills: 0 | Status: SHIPPED | OUTPATIENT
Start: 2024-02-26 | End: 2024-02-26 | Stop reason: SDUPTHER

## 2024-02-26 RX ORDER — INSULIN GLARGINE 100 [IU]/ML
INJECTION, SOLUTION SUBCUTANEOUS
Qty: 69 ML | Refills: 0 | Status: SHIPPED | OUTPATIENT
Start: 2024-02-26 | End: 2024-05-23 | Stop reason: SDUPTHER

## 2024-02-27 ENCOUNTER — OFFICE VISIT (OUTPATIENT)
Dept: ORTHOPEDIC SURGERY | Facility: CLINIC | Age: 74
End: 2024-02-27
Payer: MEDICARE

## 2024-02-27 ENCOUNTER — HOSPITAL ENCOUNTER (OUTPATIENT)
Dept: RADIOLOGY | Facility: CLINIC | Age: 74
Discharge: HOME | End: 2024-02-27
Payer: MEDICARE

## 2024-02-27 DIAGNOSIS — M25.551 ACUTE RIGHT HIP PAIN: ICD-10-CM

## 2024-02-27 DIAGNOSIS — M54.50 LUMBAR SPINE PAIN: ICD-10-CM

## 2024-02-27 DIAGNOSIS — M70.61 TROCHANTERIC BURSITIS OF RIGHT HIP: ICD-10-CM

## 2024-02-27 DIAGNOSIS — M54.50 ACUTE RIGHT-SIDED LOW BACK PAIN, UNSPECIFIED WHETHER SCIATICA PRESENT: ICD-10-CM

## 2024-02-27 PROCEDURE — 99214 OFFICE O/P EST MOD 30 MIN: CPT | Performed by: FAMILY MEDICINE

## 2024-02-27 PROCEDURE — 1159F MED LIST DOCD IN RCRD: CPT | Performed by: FAMILY MEDICINE

## 2024-02-27 PROCEDURE — 3048F LDL-C <100 MG/DL: CPT | Performed by: FAMILY MEDICINE

## 2024-02-27 PROCEDURE — 99204 OFFICE O/P NEW MOD 45 MIN: CPT | Performed by: FAMILY MEDICINE

## 2024-02-27 PROCEDURE — 73502 X-RAY EXAM HIP UNI 2-3 VIEWS: CPT | Mod: RIGHT SIDE | Performed by: FAMILY MEDICINE

## 2024-02-27 PROCEDURE — 72100 X-RAY EXAM L-S SPINE 2/3 VWS: CPT | Performed by: FAMILY MEDICINE

## 2024-02-27 PROCEDURE — 3052F HG A1C>EQUAL 8.0%<EQUAL 9.0%: CPT | Performed by: FAMILY MEDICINE

## 2024-02-27 PROCEDURE — 72100 X-RAY EXAM L-S SPINE 2/3 VWS: CPT

## 2024-02-27 PROCEDURE — 1160F RVW MEDS BY RX/DR IN RCRD: CPT | Performed by: FAMILY MEDICINE

## 2024-02-27 PROCEDURE — 1036F TOBACCO NON-USER: CPT | Performed by: FAMILY MEDICINE

## 2024-02-27 PROCEDURE — 73502 X-RAY EXAM HIP UNI 2-3 VIEWS: CPT | Mod: RT

## 2024-02-27 RX ORDER — NAPROXEN 500 MG/1
500 TABLET ORAL
Qty: 28 TABLET | Refills: 0 | Status: SHIPPED | OUTPATIENT
Start: 2024-02-27 | End: 2024-03-12

## 2024-02-27 RX ORDER — CYCLOBENZAPRINE HCL 10 MG
10 TABLET ORAL NIGHTLY PRN
Qty: 14 TABLET | Refills: 0 | Status: SHIPPED | OUTPATIENT
Start: 2024-02-27 | End: 2024-03-12

## 2024-02-27 NOTE — PROGRESS NOTES
Acute Injury New Patient Visit    CC:   Chief Complaint   Patient presents with    Right Hip - Pain     Patient can't recall any injury has pain in hip and lower pain. Can't sleep at night.     Lower Back - Pain       HPI: Marco Antonio is a 73 y.o.male who presents today with new complaints of pain and discomfort to the lateral side of the right hip.  He cannot recall any obvious injury or trauma.  He does have a history of type 2 diabetes.  He does have some bilateral neuropathy it sounds like to the lower extremities.  The lateral side of the hip today is not feeling so bad but over the last several nights he had difficulty tolerating sleep he presents here today for further evaluation he points to the front and lateral aspect of the hip as area most discomfort he denies any numbness tingling or burning radiating from the back into his thigh or groin.        Review of Systems   GENERAL: Negative for malaise, significant weight loss, fever  MUSCULOSKELETAL: See HPI  NEURO: Positive for numbness / tingling at the level of the bilateral feet    Past Medical History  Past Medical History:   Diagnosis Date    Acute hematogenous osteomyelitis of left foot (CMS/[facility]) 04/06/2023    Bacteremia 04/06/2023    Ischemic stroke (CMS/[facility]) 08/28/2016    Personal history of transient ischemic attack (TIA), and cerebral infarction without residual deficits 09/19/2016    History of stroke    Personal history of transient ischemic attack (TIA), and cerebral infarction without residual deficits 10/11/2022    Arterial ischemic stroke, remote, resolved       Medication review  Medication Documentation Review Audit       Reviewed by Cole C Budinsky, MD (Physician) on 02/27/24 at 1628      Medication Order Taking? Sig Documenting Provider Last Dose Status   aspirin 81 mg EC tablet [id number] No Take 1 tablet (81 mg) by mouth once daily. Historical Provider, MD Taking Active   atorvastatin (Lipitor) 20 mg tablet [id number] No Take 1 tablet (20  "mg) by mouth once daily. Tiff Ward PA-C Taking Active   blood sugar diagnostic (Blood Glucose Test) strip [id number] No Test glucose 3 times daily Rm Minaya DO Taking Active   blood-glucose sensor (Dexcom G6 Sensor) device [id number] No Change sensor every 10 days   Patient not taking: Reported on 1/31/2024    Rm Minaya DO Not Taking Active   cholecalciferol (Vitamin D-3) 50 MCG (2000 UT) tablet [id number]  Take 6000 units once day Rm Minaya DO  Active   Dexcom G4 platinum  (Dexcom G6 ) misc [id number] No Use as instructed Rm Minaya DO Unknown Active   Dexcom G4 platinum transmitter (Dexcom G6 Transmitter) device [id number] No Use as instructed Rm Minaya, DO Unknown Active   glimepiride (Amaryl) 4 mg tablet [id number] No Take 2 tablets (8 mg) by mouth once daily in the morning. Take before meals. Tiff Ward PA-C Taking Active     Discontinued 02/26/24 0537     Discontinued 02/26/24 1120   insulin glargine (Lantus Solostar U-100 Insulin) 100 unit/mL (3 mL) pen [id number]  ADMINISTER 75 UNITS UNDER THE SKIN EVERY DAY Tiff Ward PA-C  Active   insulin lispro (HumaLOG) 100 unit/mL injection [id number]  INJECT 32 UNITS BEFORE BREAKFAST, 29 UNITS BEFORE LUNCH, AND 32 UNITS BEFORE EVENING MEAL Rm Minaya DO  Active   lancets misc [id number] No once daily. Historical Provider, MD Taking Active   lisinopriL-hydrochlorothiazide 20-12.5 mg tablet [id number]  Take 2 tablets by mouth once daily. Tiff Ward PA-C  Active   melatonin 3 mg tablet [id number] No Take 1 tablet (3 mg) by mouth once daily at bedtime. Historical Provider, MD Not Taking Active   pen needle, diabetic 31 gauge x 3/16\" needle [id number] No Use as directed Historical Provider, MD Taking Active                    Allergies  Allergies   Allergen Reactions    Pioglitazone Shortness of breath and Other     myalgia    Amlodipine Other     insomnia    Atenolol Other     insomnia    Carvedilol Other     Sleep " problems    Pravastatin Other     insomnia       Social History  Social History     Socioeconomic History    Marital status:      Spouse name: Not on file    Number of children: Not on file    Years of education: Not on file    Highest education level: Not on file   Occupational History    Not on file   Tobacco Use    Smoking status: Never    Smokeless tobacco: Never   Substance and Sexual Activity    Alcohol use: Never    Drug use: Never    Sexual activity: Not on file   Other Topics Concern    Not on file   Social History Narrative    Not on file     Social Determinants of Health     Financial Resource Strain: Not on file   Food Insecurity: Not on file   Transportation Needs: Not on file   Physical Activity: Not on file   Stress: Not on file   Social Connections: Not on file   Intimate Partner Violence: Not on file   Housing Stability: Not on file       Surgical History  Past Surgical History:   Procedure Laterality Date    OTHER SURGICAL HISTORY  02/20/2021    No history of surgery       Physical Exam:  GENERAL:  Patient is awake, alert, and oriented to person place and time.  Patient appears well nourished and well kept.  Affect Calm, Not Acutely Distressed.  HEENT:  Normocephalic, Atraumatic, EOMI  CARDIOVASCULAR:  Hemodynamically stable.  RESPIRATORY:  Normal respirations with unlabored breathing.  NEURO: Gross sensation intact to the lower extremities bilaterally.  Extremity: Right hip exam demonstrates no tenderness palpation over the greater trochanter bursa.  There is no piriformis or SI pain.  He has a negative straight leg raise.  He has 4+ out of 5 strength with resisted hip flexion which is slightly diminished when compared to the contralateral limb.  He has 5 out of 5 abduction and adduction.  Normal internal and external rotation about the hips.  Bilateral calves are soft and nontender no tenderness down the midline of the spine      Diagnostics: X-rays of the lumbar spine and right hip  demonstrate mild degenerative changes throughout no presence of fracture        Procedure: None  Procedures    Assessment:   Problem List Items Addressed This Visit    None  Visit Diagnoses       Acute right hip pain        Relevant Orders    XR hip right with pelvis when performed 2 or 3 views    XR lumbar spine 2-3 views    Lumbar spine pain        Relevant Orders    XR hip right with pelvis when performed 2 or 3 views    XR lumbar spine 2-3 views    Trochanteric bursitis of right hip        Acute right-sided low back pain, unspecified whether sciatica present                 Plan: At this time encouraged the patient continue with ice and will provide him with an anti-inflammatory and a muscle relaxer.  Will give him some back and hip exercises to work on range of motion and strength recovery congratulated him on some of his increased blood sugar control recently.  Would still like to hold off on oral steroid today we may consider Toradol injection versus steroid injection at follow-up if necessary with any persistent pain to the bursa.  Will hold off on further advanced imaging for now.  No need for repeat x-rays next visit.  No obvious convincing or concerning signs or symptoms of any radicular pathology today.  Orders Placed This Encounter    XR hip right with pelvis when performed 2 or 3 views    XR lumbar spine 2-3 views      At the conclusion of the visit there were no further questions by the patient/family regarding their plan of care.  Patient was instructed to call or return with any issues, questions, or concerns regarding their injury and/or treatment plan described above.     02/27/24 at 4:33 PM - Cole C Budinsky, MD    Office: [phone]    This note was prepared using voice recognition software.  The details of this note are correct and have been reviewed, and corrected to the best of my ability.  Some grammatical errors may persist related to the Dragon software.

## 2024-03-06 DIAGNOSIS — E11.621 TYPE 2 DIABETES MELLITUS WITH FOOT ULCER, WITH LONG-TERM CURRENT USE OF INSULIN (MULTI): ICD-10-CM

## 2024-03-06 DIAGNOSIS — L97.509 TYPE 2 DIABETES MELLITUS WITH FOOT ULCER, WITH LONG-TERM CURRENT USE OF INSULIN (MULTI): ICD-10-CM

## 2024-03-06 DIAGNOSIS — Z79.4 TYPE 2 DIABETES MELLITUS WITH FOOT ULCER, WITH LONG-TERM CURRENT USE OF INSULIN (MULTI): ICD-10-CM

## 2024-03-06 RX ORDER — GLIMEPIRIDE 4 MG/1
TABLET ORAL
Qty: 180 TABLET | Refills: 0 | OUTPATIENT
Start: 2024-03-06

## 2024-03-11 ENCOUNTER — TELEPHONE (OUTPATIENT)
Dept: ORTHOPEDIC SURGERY | Facility: CLINIC | Age: 74
End: 2024-03-11
Payer: MEDICARE

## 2024-03-11 DIAGNOSIS — M70.61 TROCHANTERIC BURSITIS OF RIGHT HIP: ICD-10-CM

## 2024-03-11 DIAGNOSIS — M54.50 ACUTE RIGHT-SIDED LOW BACK PAIN, UNSPECIFIED WHETHER SCIATICA PRESENT: ICD-10-CM

## 2024-03-11 DIAGNOSIS — M54.50 LUMBAR SPINE PAIN: ICD-10-CM

## 2024-03-11 RX ORDER — NAPROXEN 500 MG/1
500 TABLET ORAL 2 TIMES DAILY
Qty: 60 TABLET | Refills: 0 | Status: SHIPPED | OUTPATIENT
Start: 2024-03-11 | End: 2024-04-10

## 2024-03-11 RX ORDER — CYCLOBENZAPRINE HCL 10 MG
10 TABLET ORAL 3 TIMES DAILY PRN
Qty: 30 TABLET | Refills: 0 | Status: SHIPPED | OUTPATIENT
Start: 2024-03-11 | End: 2024-06-05 | Stop reason: WASHOUT

## 2024-03-14 DIAGNOSIS — Z79.4 TYPE 2 DIABETES MELLITUS WITH FOOT ULCER, WITH LONG-TERM CURRENT USE OF INSULIN (MULTI): ICD-10-CM

## 2024-03-14 DIAGNOSIS — E11.621 TYPE 2 DIABETES MELLITUS WITH FOOT ULCER, WITH LONG-TERM CURRENT USE OF INSULIN (MULTI): ICD-10-CM

## 2024-03-14 DIAGNOSIS — L97.509 TYPE 2 DIABETES MELLITUS WITH FOOT ULCER, WITH LONG-TERM CURRENT USE OF INSULIN (MULTI): ICD-10-CM

## 2024-03-18 RX ORDER — GLIMEPIRIDE 4 MG/1
8 TABLET ORAL
Qty: 180 TABLET | Refills: 0 | Status: SHIPPED | OUTPATIENT
Start: 2024-03-18 | End: 2024-06-07 | Stop reason: SDUPTHER

## 2024-03-27 ENCOUNTER — OFFICE VISIT (OUTPATIENT)
Dept: ORTHOPEDIC SURGERY | Facility: CLINIC | Age: 74
End: 2024-03-27
Payer: MEDICARE

## 2024-03-27 DIAGNOSIS — M54.50 ACUTE RIGHT-SIDED LOW BACK PAIN, UNSPECIFIED WHETHER SCIATICA PRESENT: ICD-10-CM

## 2024-03-27 DIAGNOSIS — M70.61 TROCHANTERIC BURSITIS OF RIGHT HIP: Primary | ICD-10-CM

## 2024-03-27 PROCEDURE — 1159F MED LIST DOCD IN RCRD: CPT | Performed by: FAMILY MEDICINE

## 2024-03-27 PROCEDURE — 3048F LDL-C <100 MG/DL: CPT | Performed by: FAMILY MEDICINE

## 2024-03-27 PROCEDURE — 99213 OFFICE O/P EST LOW 20 MIN: CPT | Performed by: FAMILY MEDICINE

## 2024-03-27 PROCEDURE — 3052F HG A1C>EQUAL 8.0%<EQUAL 9.0%: CPT | Performed by: FAMILY MEDICINE

## 2024-03-27 PROCEDURE — 1160F RVW MEDS BY RX/DR IN RCRD: CPT | Performed by: FAMILY MEDICINE

## 2024-03-27 PROCEDURE — 1036F TOBACCO NON-USER: CPT | Performed by: FAMILY MEDICINE

## 2024-03-27 ASSESSMENT — PAIN - FUNCTIONAL ASSESSMENT: PAIN_FUNCTIONAL_ASSESSMENT: NO/DENIES PAIN

## 2024-03-27 NOTE — PROGRESS NOTES
Established Patient Follow-Up Visit    CC:   Chief Complaint   Patient presents with    Right Hip - Follow-up     Doing well - no pain          HPI:  Marco Antonio is a 73 y.o. male returns here today for follow-up visit regarding: Low back pain greater trochanter bursitis.  He denies any numbness tingling or burning here today states he is doing well not having any issues did have a little bit of issues with getting situated with his home exercises but stated that the medication certainly provided him a lot of relief and has been more compliant.  He denies any additional issues or concerns has questions regarding what to do going forward.          REVIEW OF SYSTEMS:  GENERAL: Negative for malaise, significant weight loss, fever  MUSCULOSKELETAL: See HPI  NEURO: Negative for numbness / tingling       PHYSICAL EXAM:  -Neuro: Gross sensation intact to the lower extremities bilaterally.  -Extremity: Right hip demonstrates normal internal and external rotation he has a negative straight leg raise no tenderness palpation over the piriformis or over the greater trochanter bursa.  5 out of 5 hip flexion abduction and adduction strength today.  Ambulates with normal gait.    IMAGING: No new imaging today      PROCEDURE: None  Procedures     ASSESSMENT:   Follow-up visit for:  Problem List Items Addressed This Visit    None  Visit Diagnoses       Trochanteric bursitis of right hip    -  Primary    Acute right-sided low back pain, unspecified whether sciatica present                 PLAN: At this time we will offer the patient observation and follow-up as needed.  He can continue with intermittent over-the-counter anti-inflammatories and recommended that we have him work on his exercises a few times a week going forward.  He should call or return with any issues and we will consider potential injection or if necessary further advanced imaging for any recalcitrant pain.  No orders of the defined types were placed in this  encounter.          At the conclusion of the visit there were no further questions by the patient/family regarding their plan of care.  Patient was instructed to call or return with any issues, questions, or concerns regarding their injury and/or treatment plan described above.     03/27/24 at 1:16 PM - Cole C Budinsky, MD    Office: [phone]    This note was prepared using voice recognition software.  The details of this note are correct and have been reviewed, and corrected to the best of my ability.  Some grammatical errors may persist related to the Dragon software.

## 2024-04-05 DIAGNOSIS — E78.2 MIXED HYPERLIPIDEMIA: ICD-10-CM

## 2024-04-09 RX ORDER — ATORVASTATIN CALCIUM 20 MG/1
20 TABLET, FILM COATED ORAL DAILY
Qty: 90 TABLET | Refills: 0 | OUTPATIENT
Start: 2024-04-09

## 2024-04-17 DIAGNOSIS — Z79.4 TYPE 2 DIABETES MELLITUS WITH DIABETIC POLYNEUROPATHY, WITH LONG-TERM CURRENT USE OF INSULIN (MULTI): ICD-10-CM

## 2024-04-17 DIAGNOSIS — E11.42 TYPE 2 DIABETES MELLITUS WITH DIABETIC POLYNEUROPATHY, WITH LONG-TERM CURRENT USE OF INSULIN (MULTI): ICD-10-CM

## 2024-04-17 RX ORDER — IBUPROFEN 200 MG
CAPSULE ORAL
Qty: 100 STRIP | Refills: 0 | Status: SHIPPED | OUTPATIENT
Start: 2024-04-17 | End: 2024-04-18 | Stop reason: SDUPTHER

## 2024-04-18 DIAGNOSIS — E78.2 MIXED HYPERLIPIDEMIA: ICD-10-CM

## 2024-04-18 DIAGNOSIS — E11.42 TYPE 2 DIABETES MELLITUS WITH DIABETIC POLYNEUROPATHY, WITH LONG-TERM CURRENT USE OF INSULIN (MULTI): ICD-10-CM

## 2024-04-18 DIAGNOSIS — Z79.4 TYPE 2 DIABETES MELLITUS WITH DIABETIC POLYNEUROPATHY, WITH LONG-TERM CURRENT USE OF INSULIN (MULTI): ICD-10-CM

## 2024-04-18 RX ORDER — IBUPROFEN 200 MG
CAPSULE ORAL
Qty: 125 STRIP | Refills: 3 | Status: SHIPPED | OUTPATIENT
Start: 2024-04-18

## 2024-04-18 NOTE — PROGRESS NOTES
Pt called stating the insurance will not allow him to refill the test strips until 4/25/2024 as it is too early to fill them.    He tests 4 times per day if glucose is elevated.  Pt sometimes uses an extra strip if he gets an error reading as well.    We will update the RX to reflect testing 4 times per day due to insulin use.

## 2024-04-22 RX ORDER — ATORVASTATIN CALCIUM 20 MG/1
20 TABLET, FILM COATED ORAL DAILY
Qty: 90 TABLET | Refills: 0 | Status: SHIPPED | OUTPATIENT
Start: 2024-04-22

## 2024-04-30 ENCOUNTER — APPOINTMENT (OUTPATIENT)
Dept: PRIMARY CARE | Facility: CLINIC | Age: 74
End: 2024-04-30
Payer: MEDICARE

## 2024-05-06 ENCOUNTER — TELEPHONE (OUTPATIENT)
Dept: PRIMARY CARE | Facility: CLINIC | Age: 74
End: 2024-05-06
Payer: MEDICARE

## 2024-05-06 DIAGNOSIS — Z79.4 TYPE 2 DIABETES MELLITUS WITH DIABETIC POLYNEUROPATHY, WITH LONG-TERM CURRENT USE OF INSULIN (MULTI): ICD-10-CM

## 2024-05-06 DIAGNOSIS — E11.42 TYPE 2 DIABETES MELLITUS WITH DIABETIC POLYNEUROPATHY, WITH LONG-TERM CURRENT USE OF INSULIN (MULTI): ICD-10-CM

## 2024-05-07 RX ORDER — INSULIN LISPRO 100 [IU]/ML
INJECTION, SOLUTION INTRAVENOUS; SUBCUTANEOUS
Qty: 84 ML | Refills: 0 | Status: SHIPPED | OUTPATIENT
Start: 2024-05-07 | End: 2024-05-23 | Stop reason: SDUPTHER

## 2024-05-08 ENCOUNTER — APPOINTMENT (OUTPATIENT)
Dept: PRIMARY CARE | Facility: CLINIC | Age: 74
End: 2024-05-08
Payer: MEDICARE

## 2024-05-14 ENCOUNTER — TELEPHONE (OUTPATIENT)
Dept: PRIMARY CARE | Facility: CLINIC | Age: 74
End: 2024-05-14
Payer: MEDICARE

## 2024-05-14 DIAGNOSIS — I10 PRIMARY HYPERTENSION: ICD-10-CM

## 2024-05-15 RX ORDER — LISINOPRIL AND HYDROCHLOROTHIAZIDE 12.5; 2 MG/1; MG/1
2 TABLET ORAL DAILY
Qty: 180 TABLET | Refills: 0 | Status: SHIPPED | OUTPATIENT
Start: 2024-05-15

## 2024-05-23 ENCOUNTER — TELEPHONE (OUTPATIENT)
Dept: PRIMARY CARE | Facility: CLINIC | Age: 74
End: 2024-05-23
Payer: MEDICARE

## 2024-05-23 DIAGNOSIS — Z79.4 TYPE 2 DIABETES MELLITUS WITH FOOT ULCER, WITH LONG-TERM CURRENT USE OF INSULIN (MULTI): ICD-10-CM

## 2024-05-23 DIAGNOSIS — E11.621 TYPE 2 DIABETES MELLITUS WITH FOOT ULCER, WITH LONG-TERM CURRENT USE OF INSULIN (MULTI): ICD-10-CM

## 2024-05-23 DIAGNOSIS — Z79.4 TYPE 2 DIABETES MELLITUS WITH DIABETIC POLYNEUROPATHY, WITH LONG-TERM CURRENT USE OF INSULIN (MULTI): ICD-10-CM

## 2024-05-23 DIAGNOSIS — E11.42 TYPE 2 DIABETES MELLITUS WITH DIABETIC POLYNEUROPATHY, WITH LONG-TERM CURRENT USE OF INSULIN (MULTI): ICD-10-CM

## 2024-05-23 DIAGNOSIS — L97.509 TYPE 2 DIABETES MELLITUS WITH FOOT ULCER, WITH LONG-TERM CURRENT USE OF INSULIN (MULTI): ICD-10-CM

## 2024-05-23 RX ORDER — PEN NEEDLE, DIABETIC 30 GX3/16"
NEEDLE, DISPOSABLE MISCELLANEOUS
Qty: 400 EACH | Refills: 3 | Status: SHIPPED | OUTPATIENT
Start: 2024-05-23

## 2024-05-23 RX ORDER — INSULIN LISPRO 100 [IU]/ML
93 INJECTION, SOLUTION INTRAVENOUS; SUBCUTANEOUS DAILY
Qty: 90 ML | Refills: 0 | Status: SHIPPED | OUTPATIENT
Start: 2024-05-23 | End: 2024-05-24 | Stop reason: SDUPTHER

## 2024-05-23 RX ORDER — INSULIN GLARGINE 100 [IU]/ML
75 INJECTION, SOLUTION SUBCUTANEOUS DAILY
Qty: 69 ML | Refills: 0 | Status: SHIPPED | OUTPATIENT
Start: 2024-05-23 | End: 2024-05-24 | Stop reason: SDUPTHER

## 2024-05-23 NOTE — TELEPHONE ENCOUNTER
Patient called and states that he has been having trouble getting his Humalog insulin and only has a limited amount of his Lantus left. He states he got ahold of [facility] on [address] to which they say they maybe will have the Humalog in by tomorrow 5/4/24 but are not sure due to it being backordered, but would like for it to be sent there just in case. He says he would like to know what he should do if they do not have the humalog as he only has 2 lantus doses at the moment. Patient was advised to call around to other pharmacies outside of [facility] to search for who has the humalog available in the meantime, please advise.

## 2024-05-24 ENCOUNTER — TELEMEDICINE (OUTPATIENT)
Dept: PHARMACY | Facility: HOSPITAL | Age: 74
End: 2024-05-24
Payer: MEDICARE

## 2024-05-24 DIAGNOSIS — E11.42 TYPE 2 DIABETES MELLITUS WITH DIABETIC POLYNEUROPATHY, WITH LONG-TERM CURRENT USE OF INSULIN (MULTI): Primary | ICD-10-CM

## 2024-05-24 DIAGNOSIS — E11.42 TYPE 2 DIABETES MELLITUS WITH DIABETIC POLYNEUROPATHY, WITH LONG-TERM CURRENT USE OF INSULIN (MULTI): ICD-10-CM

## 2024-05-24 DIAGNOSIS — Z79.4 TYPE 2 DIABETES MELLITUS WITH DIABETIC POLYNEUROPATHY, WITH LONG-TERM CURRENT USE OF INSULIN (MULTI): Primary | ICD-10-CM

## 2024-05-24 DIAGNOSIS — Z79.4 TYPE 2 DIABETES MELLITUS WITH DIABETIC POLYNEUROPATHY, WITH LONG-TERM CURRENT USE OF INSULIN (MULTI): ICD-10-CM

## 2024-05-24 RX ORDER — INSULIN LISPRO 100 [IU]/ML
93 INJECTION, SOLUTION INTRAVENOUS; SUBCUTANEOUS DAILY
Qty: 90 ML | Refills: 0 | Status: SHIPPED | OUTPATIENT
Start: 2024-05-24 | End: 2024-06-05 | Stop reason: DRUGHIGH

## 2024-05-24 RX ORDER — INSULIN GLARGINE 100 [IU]/ML
75 INJECTION, SOLUTION SUBCUTANEOUS DAILY
Qty: 69 ML | Refills: 0 | Status: SHIPPED | OUTPATIENT
Start: 2024-05-24 | End: 2024-06-05 | Stop reason: DRUGHIGH

## 2024-05-24 NOTE — PROGRESS NOTES
"Pharmacist Clinic: Diabetes Management  Marco Antonio Gill is a 73 y.o. male was referred to Clinical Pharmacy Team for diabetes management.     Referring Provider: Rm Minaya DO     HISTORY OF PRESENT ILLNESS  Spoke to patient regarding diabetes management. Patient wants to know if we can send insulin prescriptions to [facility] in [place] due to medication unavailability at [facility]. Patient denied any other issues regarding medication.      LAB REVIEW   Glucose (mg/dL)   Date Value   01/30/2024 145 (H)   10/27/2023 179 (H)   07/29/2023 218 (H)   05/01/2023 243 (H)   04/17/2023 205 (H)     Hemoglobin A1C (%)   Date Value   01/30/2024 8.9 (H)   10/27/2023 7.9 (H)   07/29/2023 9.0 (A)   05/01/2023 8.8 (A)   03/25/2023 9.1 (A)     Bicarbonate (mmol/L)   Date Value   01/30/2024 27   10/27/2023 27   07/29/2023 25   05/01/2023 28   04/17/2023 28     Urea Nitrogen (mg/dL)   Date Value   01/30/2024 30 (H)   10/27/2023 30 (H)   07/29/2023 29 (H)   05/01/2023 32 (H)   04/17/2023 21     Creatinine (mg/dL)   Date Value   01/30/2024 1.18   10/27/2023 1.14   07/29/2023 1.05   05/01/2023 1.24   04/17/2023 1.17     Lab Results   Component Value Date    HGBA1C 8.9 (H) 01/30/2024    HGBA1C 7.9 (H) 10/27/2023    HGBA1C 9.0 (A) 07/29/2023     Lab Results   Component Value Date    LDLCALC 44 01/30/2024    CREATININE 1.18 01/30/2024     Lab Results   Component Value Date    CHOL 122 01/30/2024    CHOL 114 05/01/2023    CHOL 106 10/10/2022     Lab Results   Component Value Date    HDL 47.7 01/30/2024    HDL 45.4 05/01/2023    HDL 48.0 10/10/2022     Lab Results   Component Value Date    LDLCALC 44 01/30/2024     Lab Results   Component Value Date    TRIG 151 (H) 01/30/2024    TRIG 116 05/01/2023    TRIG 106 10/10/2022     No components found for: \"CHOLHDL\"  Lab Results   Component Value Date    LDLCALC 44 01/30/2024       DIABETES ASSESSMENT    CURRENT PHARMACOTHERAPY  - Humalog Inject 32 units before breakfast, 29 " units before lunch, and 32 units before evening meal  - Lantus Inject 75 units once daily  - glimepiride 4 mg 2 tablets once daily    SECONDARY PREVENTION  - Statin? Yes  - ACE-I/ARB? Yes  - Aspirin? Yes      RECOMMENDATIONS/PLAN  1. Patients diabetes is poorly controlled with most recent A1c of x% (goal < 7 %).     - Continue:   - Humalog Inject 32 units before breakfast, 29 units before lunch, and 32 units before evening meal  - Lantus Inject 75 units once daily  - glimepiride 4 mg 2 tablets once daily    (Will send insulin prescriptions to [facility] in [place])    Continue all meds under the continuation of care with the referring provider and clinical pharmacy team.      Thank you,  Josselin Hi, PharmD  PGY-1 Pharmacy Resident      Verbal consent to manage patient's drug therapy was obtained from patient. They were informed they may decline to participate or withdraw from participation in pharmacy services at any time.

## 2024-05-31 NOTE — PROGRESS NOTES
I reviewed the progress note and agree with the resident’s findings and plans as written. Case discussed with resident.    Rubina Stone, Maria ElenaD

## 2024-06-03 ENCOUNTER — LAB (OUTPATIENT)
Dept: LAB | Facility: LAB | Age: 74
End: 2024-06-03
Payer: MEDICARE

## 2024-06-03 DIAGNOSIS — I10 PRIMARY HYPERTENSION: ICD-10-CM

## 2024-06-03 DIAGNOSIS — E11.42 TYPE 2 DIABETES MELLITUS WITH DIABETIC POLYNEUROPATHY, WITH LONG-TERM CURRENT USE OF INSULIN (MULTI): ICD-10-CM

## 2024-06-03 DIAGNOSIS — Z79.4 TYPE 2 DIABETES MELLITUS WITH DIABETIC POLYNEUROPATHY, WITH LONG-TERM CURRENT USE OF INSULIN (MULTI): ICD-10-CM

## 2024-06-03 LAB
ANION GAP SERPL CALC-SCNC: 12 MMOL/L (ref 10–20)
BUN SERPL-MCNC: 30 MG/DL (ref 6–23)
CALCIUM SERPL-MCNC: 8.7 MG/DL (ref 8.6–10.3)
CHLORIDE SERPL-SCNC: 107 MMOL/L (ref 98–107)
CO2 SERPL-SCNC: 24 MMOL/L (ref 21–32)
CREAT SERPL-MCNC: 1.23 MG/DL (ref 0.5–1.3)
CREAT UR-MCNC: 103.8 MG/DL (ref 20–370)
EGFRCR SERPLBLD CKD-EPI 2021: 61 ML/MIN/1.73M*2
GLUCOSE SERPL-MCNC: 165 MG/DL (ref 74–99)
MICROALBUMIN UR-MCNC: 177.9 MG/L
MICROALBUMIN/CREAT UR: 171.4 UG/MG CREAT
POTASSIUM SERPL-SCNC: 4.1 MMOL/L (ref 3.5–5.3)
SODIUM SERPL-SCNC: 139 MMOL/L (ref 136–145)

## 2024-06-03 PROCEDURE — 82043 UR ALBUMIN QUANTITATIVE: CPT

## 2024-06-03 PROCEDURE — 82570 ASSAY OF URINE CREATININE: CPT

## 2024-06-03 PROCEDURE — 80048 BASIC METABOLIC PNL TOTAL CA: CPT

## 2024-06-03 PROCEDURE — 83036 HEMOGLOBIN GLYCOSYLATED A1C: CPT

## 2024-06-03 PROCEDURE — 36415 COLL VENOUS BLD VENIPUNCTURE: CPT

## 2024-06-04 LAB
EST. AVERAGE GLUCOSE BLD GHB EST-MCNC: 209 MG/DL
HBA1C MFR BLD: 8.9 %

## 2024-06-04 NOTE — PROGRESS NOTES
"Subjective   Patient ID: Marco Antonio Gill is a 73 y.o. male who presents for Follow-up.    HPI     No new concerns   Labs: 06/03/2024  Colonoscopy ( due)         He has hypertension.  Pt does monitor his BP.  He is compliant with his antihypertensive therapy denies any side effects.   He denies CP, SOB, and dizziness.      Patient has hyperlipidemia.  He is treated with Atorvastatin.   Pt tries to limit the amount of fatty foods and high cholesterol foods that he consumes  Patient is compliant with his atorvastatin therapy and denies any noted side effects.      Pt has DM Type 2 and is insulin dependent.   He monitors his blood glucose and keeps a log of his readings.  Patient does try to limit the amount of carbohydrates that are consumed.  He denies any hypoglycemic symptoms or readings.  Also denies any polyuria, any tingling in the extremities.     He had a stroke in 2016 and is currently taking aspirin for prophylaxis.   He was previously treated with Plavix, but discontinued it on his own in September 2016 as he felt that it made him feel \"drugged.\"  Pt has refused any additional antiplatelet medication beyond the aspirin.       Review of Systems  Constitutional: Patient denies any fever, chills, loss of appetite, or unexplained weight loss.  Cardiovascular: Patient denies any chest pain, shortness of breath with exertion, tachycardia, palpitations, orthopnea, or paroxysmal nocturnal dyspnea.  Respiratory: Patient denies any cough, shortness breath, or wheezing.  Gastrointestinal: Patient denies any nausea, vomiting, diarrhea, constipation, melena, hematochezia, or reflux symptoms  Skin: Denies any rashes or skin lesions  Neurology: Patient denies any new motor or sensory losses. Denies any numbness, tingling, weakness, and incoordination of the extremities. Patient also denies any tremor, seizures, or gait instability.  Endocrinology: Denies any polyuria, polydipsia, polyphagia, or heat/cold " "intolerance.        Objective   /77   Pulse 84   Temp 36.8 °C (98.2 °F)   Ht 1.727 m (5' 8\")   Wt 104 kg (229 lb 12.8 oz)   SpO2 92%   BMI 34.94 kg/m²     Physical Exam  General Appearance: Alert and cooperative, in no acute distress, well-developed/well-nourished, obese male.     Neck: Supple and without adenopathy or rigidity. There is no JVD at 90° and no carotid bruits are noted. There is no thyromegaly, thyroid tenderness, or palpable thyroid nodules.  Heart: Regular rate and rhythm without murmur or ectopy.  Lungs: Clear to auscultation bilaterally with good air exchange.  Skin: Good turgor, moist, warm and without rashes or lesions.  Neurological exam: Alert and oriented ×3, no tremor, normal gait.  Extremities: No clubbing, cyanosis, or edema        Assessment/Plan     HTN:  BP is above goal again.  Declines any changes to his BP medications despite my recommendations.     Hyperlipidemia: Stable.   Patient will continue with the current medication, Atorvastatin.   Dietary changes, exercise, and maintenance of healthy weight were discussed at length.     Diabetes mellitus type 2 with neuropathy: Most recent A1c was:  Lab Results   Component Value Date    HGBA1C 8.9 (H) 06/03/2024 7/11/2022: We increased Lantus from 58 to 62 units daily. We increased Humalog from 21 to 23 units twice daily with meals.  10/11/22: We increased the dosing of his Humalog to 25 units twice a day with meals.  1/10/2023: Recommended continuous glucose monitor to pt. He stated he will consider this at his next visit in April.  Pt was advised to add 15 units of Humalog with his lunch.  5/2/2023: We will change the Humalog to 25/15/25 Units with meals. Continue Lantus at 70 Units once daily.  8/1/2023: Pt has once again declined continuous glucose monitor and will consider at next visit in November. Increased Lantus from 70 units to 75 units QD. Increased Humalog from 25/15/25 units to 27/27/27 units with meals.  Pt " "previously stopped metformin and refused to restart it as he believes it is \"not good\" for him.  Dietary changes, exercise, and maintenance of a healthy weight were discussed at length.  Patient was advised to have a diabetic eye exam annually.  Patient was also advised to check the feet on a daily basis.  10/31/2023:  Changed Humalog to 27/28/27 units with meals.  1/31/2024: Will change Humalog to 32/29/32 units with meals   6/5/2024:  A1c remains elevated. Will change Humalog to 34/31/34 units with meals. Increased Lantus from 75 units to 80 units once a day.     Hx of stroke:   He will continue on daily aspirin therapy.   He discontinued Plavix in 2016 as it made him feel \"drugged\" and he wishes to remain on ASA only.  He has refused any additional anticoagulation despite my advice and encouragement.     Vitamin D deficiency:   1/31/2024: He will increase the vitamin D.  Pt will call with dose he is taking.     Obesity:  Diet and exercise discussed.  BMI goal of 25.     Skin lesion on scalp:  Has a raised hyperpigmented lesion on right scalp.  Was referred to Derm at 1/31/24 appt.  6/5/24: Skin lesion was determined to be benign by derm.         Refuses colon cancer screening.  PSA due 7/30/2024      Follow up in 3 months.      Scribe Attestation  By signing my name below, IDuglas , Ann   attest that this documentation has been prepared under the direction and in the presence of Rm Minaya DO.     Orders Placed This Encounter   Procedures    Vitamin D 25-Hydroxy,Total (for eval of Vitamin D levels)    Comprehensive Metabolic Panel    Hemoglobin A1C    Lipid Panel    Prostate Specific Antigen, Screen     Requested Prescriptions     Signed Prescriptions Disp Refills    insulin glargine (Lantus Solostar U-100 Insulin) 100 unit/mL (3 mL) pen       Sig: Inject 80 Units under the skin once daily.    insulin lispro (HumaLOG) 100 unit/mL injection       Sig: nject 34 units under the skin before breakfast, " 31 units before lunch, and 34 units before evening meal.

## 2024-06-05 ENCOUNTER — OFFICE VISIT (OUTPATIENT)
Dept: PRIMARY CARE | Facility: CLINIC | Age: 74
End: 2024-06-05
Payer: MEDICARE

## 2024-06-05 VITALS
OXYGEN SATURATION: 92 % | TEMPERATURE: 98.2 F | SYSTOLIC BLOOD PRESSURE: 145 MMHG | BODY MASS INDEX: 34.83 KG/M2 | HEIGHT: 68 IN | DIASTOLIC BLOOD PRESSURE: 77 MMHG | HEART RATE: 84 BPM | WEIGHT: 229.8 LBS

## 2024-06-05 DIAGNOSIS — L98.9 SCALP LESION: ICD-10-CM

## 2024-06-05 DIAGNOSIS — E66.9 CLASS 1 OBESITY WITH SERIOUS COMORBIDITY AND BODY MASS INDEX (BMI) OF 34.0 TO 34.9 IN ADULT, UNSPECIFIED OBESITY TYPE: ICD-10-CM

## 2024-06-05 DIAGNOSIS — E78.2 MIXED HYPERLIPIDEMIA: ICD-10-CM

## 2024-06-05 DIAGNOSIS — Z86.73 HISTORY OF STROKE: ICD-10-CM

## 2024-06-05 DIAGNOSIS — Z12.5 PROSTATE CANCER SCREENING: ICD-10-CM

## 2024-06-05 DIAGNOSIS — E55.9 VITAMIN D DEFICIENCY: ICD-10-CM

## 2024-06-05 DIAGNOSIS — I10 PRIMARY HYPERTENSION: Primary | ICD-10-CM

## 2024-06-05 DIAGNOSIS — E11.42 TYPE 2 DIABETES MELLITUS WITH DIABETIC POLYNEUROPATHY, WITH LONG-TERM CURRENT USE OF INSULIN (MULTI): ICD-10-CM

## 2024-06-05 DIAGNOSIS — Z79.4 TYPE 2 DIABETES MELLITUS WITH DIABETIC POLYNEUROPATHY, WITH LONG-TERM CURRENT USE OF INSULIN (MULTI): ICD-10-CM

## 2024-06-05 PROCEDURE — 3078F DIAST BP <80 MM HG: CPT | Performed by: FAMILY MEDICINE

## 2024-06-05 PROCEDURE — 3060F POS MICROALBUMINURIA REV: CPT | Performed by: FAMILY MEDICINE

## 2024-06-05 PROCEDURE — 3048F LDL-C <100 MG/DL: CPT | Performed by: FAMILY MEDICINE

## 2024-06-05 PROCEDURE — 3052F HG A1C>EQUAL 8.0%<EQUAL 9.0%: CPT | Performed by: FAMILY MEDICINE

## 2024-06-05 PROCEDURE — 1160F RVW MEDS BY RX/DR IN RCRD: CPT | Performed by: FAMILY MEDICINE

## 2024-06-05 PROCEDURE — 99214 OFFICE O/P EST MOD 30 MIN: CPT | Performed by: FAMILY MEDICINE

## 2024-06-05 PROCEDURE — 1124F ACP DISCUSS-NO DSCNMKR DOCD: CPT | Performed by: FAMILY MEDICINE

## 2024-06-05 PROCEDURE — 3077F SYST BP >= 140 MM HG: CPT | Performed by: FAMILY MEDICINE

## 2024-06-05 PROCEDURE — 1159F MED LIST DOCD IN RCRD: CPT | Performed by: FAMILY MEDICINE

## 2024-06-05 RX ORDER — INSULIN GLARGINE 100 [IU]/ML
80 INJECTION, SOLUTION SUBCUTANEOUS DAILY
Status: SHIPPED
Start: 2024-06-05

## 2024-06-05 RX ORDER — INSULIN LISPRO 100 [IU]/ML
INJECTION, SOLUTION INTRAVENOUS; SUBCUTANEOUS
Status: SHIPPED
Start: 2024-06-05

## 2024-06-05 NOTE — PATIENT INSTRUCTIONS
Increase the Lantus and Humlog as we discussed.    Follow up in 3 months with labs to be done PRIOR.    It was a pleasure to see you today. Thank you for choosing us for your health care needs.    If you have lab or other testing completed and have not been informed of results within one week, please call the office for your results.    If you haven't done so, consider signing up for [facility] [employer], the [facility] personal health record. Ask the staff how you can get started.

## 2024-06-07 ENCOUNTER — TELEPHONE (OUTPATIENT)
Dept: PRIMARY CARE | Facility: CLINIC | Age: 74
End: 2024-06-07
Payer: MEDICARE

## 2024-06-07 DIAGNOSIS — L97.509 TYPE 2 DIABETES MELLITUS WITH FOOT ULCER, WITH LONG-TERM CURRENT USE OF INSULIN (MULTI): ICD-10-CM

## 2024-06-07 DIAGNOSIS — E11.621 TYPE 2 DIABETES MELLITUS WITH FOOT ULCER, WITH LONG-TERM CURRENT USE OF INSULIN (MULTI): ICD-10-CM

## 2024-06-07 DIAGNOSIS — Z79.4 TYPE 2 DIABETES MELLITUS WITH FOOT ULCER, WITH LONG-TERM CURRENT USE OF INSULIN (MULTI): ICD-10-CM

## 2024-06-10 RX ORDER — GLIMEPIRIDE 4 MG/1
8 TABLET ORAL
Qty: 180 TABLET | Refills: 0 | Status: SHIPPED | OUTPATIENT
Start: 2024-06-10

## 2024-07-22 DIAGNOSIS — E78.2 MIXED HYPERLIPIDEMIA: ICD-10-CM

## 2024-07-22 RX ORDER — ATORVASTATIN CALCIUM 20 MG/1
20 TABLET, FILM COATED ORAL DAILY
Qty: 90 TABLET | Refills: 0 | Status: SHIPPED | OUTPATIENT
Start: 2024-07-22

## 2024-07-22 NOTE — TELEPHONE ENCOUNTER
Patient phones the office today w/ request to refill his Atorvastatin (Lipitor) 20mg 1 TAB every day to be sent to [facility] on [address] in [place].     Thank you.

## 2024-08-13 DIAGNOSIS — I10 PRIMARY HYPERTENSION: ICD-10-CM

## 2024-08-13 RX ORDER — LISINOPRIL AND HYDROCHLOROTHIAZIDE 12.5; 2 MG/1; MG/1
2 TABLET ORAL DAILY
Qty: 180 TABLET | Refills: 0 | Status: SHIPPED | OUTPATIENT
Start: 2024-08-13

## 2024-08-28 DIAGNOSIS — Z79.4 TYPE 2 DIABETES MELLITUS WITH DIABETIC POLYNEUROPATHY, WITH LONG-TERM CURRENT USE OF INSULIN (MULTI): ICD-10-CM

## 2024-08-28 DIAGNOSIS — E11.42 TYPE 2 DIABETES MELLITUS WITH DIABETIC POLYNEUROPATHY, WITH LONG-TERM CURRENT USE OF INSULIN (MULTI): ICD-10-CM

## 2024-08-28 RX ORDER — INSULIN LISPRO 100 [IU]/ML
INJECTION, SOLUTION INTRAVENOUS; SUBCUTANEOUS
Qty: 10 ML | Refills: 2 | Status: SHIPPED | OUTPATIENT
Start: 2024-08-28

## 2024-08-28 NOTE — TELEPHONE ENCOUNTER
Dr Minaya pt requesting refill  Humalog 100un/ml, inject 34 units before breakfast, 31 before lunch, and 34 before dinner.  Tish garcia Josr  Pt's # [phone]

## 2024-09-03 ENCOUNTER — LAB (OUTPATIENT)
Dept: LAB | Facility: LAB | Age: 74
End: 2024-09-03
Payer: MEDICARE

## 2024-09-03 DIAGNOSIS — I10 PRIMARY HYPERTENSION: ICD-10-CM

## 2024-09-03 DIAGNOSIS — E55.9 VITAMIN D DEFICIENCY: ICD-10-CM

## 2024-09-03 DIAGNOSIS — Z79.4 TYPE 2 DIABETES MELLITUS WITH DIABETIC POLYNEUROPATHY, WITH LONG-TERM CURRENT USE OF INSULIN (MULTI): ICD-10-CM

## 2024-09-03 DIAGNOSIS — E11.42 TYPE 2 DIABETES MELLITUS WITH DIABETIC POLYNEUROPATHY, WITH LONG-TERM CURRENT USE OF INSULIN (MULTI): ICD-10-CM

## 2024-09-03 DIAGNOSIS — Z12.5 PROSTATE CANCER SCREENING: ICD-10-CM

## 2024-09-03 DIAGNOSIS — E78.2 MIXED HYPERLIPIDEMIA: ICD-10-CM

## 2024-09-03 LAB
25(OH)D3 SERPL-MCNC: 29 NG/ML (ref 30–100)
ALBUMIN SERPL BCP-MCNC: 3.5 G/DL (ref 3.4–5)
ALP SERPL-CCNC: 51 U/L (ref 33–136)
ALT SERPL W P-5'-P-CCNC: 42 U/L (ref 10–52)
ANION GAP SERPL CALC-SCNC: 12 MMOL/L (ref 10–20)
AST SERPL W P-5'-P-CCNC: 27 U/L (ref 9–39)
BILIRUB SERPL-MCNC: 0.8 MG/DL (ref 0–1.2)
BUN SERPL-MCNC: 28 MG/DL (ref 6–23)
CALCIUM SERPL-MCNC: 8.4 MG/DL (ref 8.6–10.3)
CHLORIDE SERPL-SCNC: 107 MMOL/L (ref 98–107)
CHOLEST SERPL-MCNC: 117 MG/DL (ref 0–199)
CHOLESTEROL/HDL RATIO: 2.7
CO2 SERPL-SCNC: 24 MMOL/L (ref 21–32)
CREAT SERPL-MCNC: 1.12 MG/DL (ref 0.5–1.3)
EGFRCR SERPLBLD CKD-EPI 2021: 69 ML/MIN/1.73M*2
GLUCOSE SERPL-MCNC: 172 MG/DL (ref 74–99)
HDLC SERPL-MCNC: 44 MG/DL
LDLC SERPL CALC-MCNC: 48 MG/DL
NON HDL CHOLESTEROL: 73 MG/DL (ref 0–149)
POTASSIUM SERPL-SCNC: 4.2 MMOL/L (ref 3.5–5.3)
PROT SERPL-MCNC: 6.2 G/DL (ref 6.4–8.2)
PSA SERPL-MCNC: 3.55 NG/ML
SODIUM SERPL-SCNC: 139 MMOL/L (ref 136–145)
TRIGL SERPL-MCNC: 126 MG/DL (ref 0–149)
VLDL: 25 MG/DL (ref 0–40)

## 2024-09-03 PROCEDURE — 36415 COLL VENOUS BLD VENIPUNCTURE: CPT

## 2024-09-03 PROCEDURE — 80053 COMPREHEN METABOLIC PANEL: CPT

## 2024-09-03 PROCEDURE — G0103 PSA SCREENING: HCPCS

## 2024-09-03 PROCEDURE — 82306 VITAMIN D 25 HYDROXY: CPT

## 2024-09-03 PROCEDURE — 80061 LIPID PANEL: CPT

## 2024-09-03 PROCEDURE — 83036 HEMOGLOBIN GLYCOSYLATED A1C: CPT

## 2024-09-04 LAB
EST. AVERAGE GLUCOSE BLD GHB EST-MCNC: 189 MG/DL
HBA1C MFR BLD: 8.2 %

## 2024-09-05 ENCOUNTER — APPOINTMENT (OUTPATIENT)
Dept: PRIMARY CARE | Facility: CLINIC | Age: 74
End: 2024-09-05
Payer: MEDICARE

## 2024-09-05 VITALS
OXYGEN SATURATION: 96 % | HEART RATE: 80 BPM | DIASTOLIC BLOOD PRESSURE: 64 MMHG | BODY MASS INDEX: 35.04 KG/M2 | WEIGHT: 231.2 LBS | HEIGHT: 68 IN | TEMPERATURE: 98.1 F | SYSTOLIC BLOOD PRESSURE: 126 MMHG

## 2024-09-05 DIAGNOSIS — E11.42 TYPE 2 DIABETES MELLITUS WITH DIABETIC POLYNEUROPATHY, WITH LONG-TERM CURRENT USE OF INSULIN: ICD-10-CM

## 2024-09-05 DIAGNOSIS — E66.9 CLASS 1 OBESITY WITH SERIOUS COMORBIDITY AND BODY MASS INDEX (BMI) OF 34.0 TO 34.9 IN ADULT, UNSPECIFIED OBESITY TYPE: ICD-10-CM

## 2024-09-05 DIAGNOSIS — I10 PRIMARY HYPERTENSION: Primary | ICD-10-CM

## 2024-09-05 DIAGNOSIS — Z86.73 HISTORY OF STROKE: ICD-10-CM

## 2024-09-05 DIAGNOSIS — E78.2 MIXED HYPERLIPIDEMIA: ICD-10-CM

## 2024-09-05 DIAGNOSIS — Z23 NEED FOR VACCINATION: ICD-10-CM

## 2024-09-05 DIAGNOSIS — R97.20 INCREASED PROSTATE SPECIFIC ANTIGEN (PSA) VELOCITY: ICD-10-CM

## 2024-09-05 DIAGNOSIS — Z79.4 TYPE 2 DIABETES MELLITUS WITH DIABETIC POLYNEUROPATHY, WITH LONG-TERM CURRENT USE OF INSULIN: ICD-10-CM

## 2024-09-05 DIAGNOSIS — Z79.4 TYPE 2 DIABETES MELLITUS WITH FOOT ULCER, WITH LONG-TERM CURRENT USE OF INSULIN: ICD-10-CM

## 2024-09-05 DIAGNOSIS — E11.621 TYPE 2 DIABETES MELLITUS WITH FOOT ULCER, WITH LONG-TERM CURRENT USE OF INSULIN: ICD-10-CM

## 2024-09-05 DIAGNOSIS — E55.9 VITAMIN D DEFICIENCY: ICD-10-CM

## 2024-09-05 DIAGNOSIS — L97.509 TYPE 2 DIABETES MELLITUS WITH FOOT ULCER, WITH LONG-TERM CURRENT USE OF INSULIN: ICD-10-CM

## 2024-09-05 PROCEDURE — 3048F LDL-C <100 MG/DL: CPT | Performed by: FAMILY MEDICINE

## 2024-09-05 PROCEDURE — 3078F DIAST BP <80 MM HG: CPT | Performed by: FAMILY MEDICINE

## 2024-09-05 PROCEDURE — 3052F HG A1C>EQUAL 8.0%<EQUAL 9.0%: CPT | Performed by: FAMILY MEDICINE

## 2024-09-05 PROCEDURE — 99214 OFFICE O/P EST MOD 30 MIN: CPT | Performed by: FAMILY MEDICINE

## 2024-09-05 PROCEDURE — G0008 ADMIN INFLUENZA VIRUS VAC: HCPCS | Performed by: FAMILY MEDICINE

## 2024-09-05 PROCEDURE — 1159F MED LIST DOCD IN RCRD: CPT | Performed by: FAMILY MEDICINE

## 2024-09-05 PROCEDURE — G2211 COMPLEX E/M VISIT ADD ON: HCPCS | Performed by: FAMILY MEDICINE

## 2024-09-05 PROCEDURE — 90662 IIV NO PRSV INCREASED AG IM: CPT | Performed by: FAMILY MEDICINE

## 2024-09-05 PROCEDURE — 3060F POS MICROALBUMINURIA REV: CPT | Performed by: FAMILY MEDICINE

## 2024-09-05 PROCEDURE — 1160F RVW MEDS BY RX/DR IN RCRD: CPT | Performed by: FAMILY MEDICINE

## 2024-09-05 PROCEDURE — 1124F ACP DISCUSS-NO DSCNMKR DOCD: CPT | Performed by: FAMILY MEDICINE

## 2024-09-05 PROCEDURE — 3074F SYST BP LT 130 MM HG: CPT | Performed by: FAMILY MEDICINE

## 2024-09-05 RX ORDER — GLIMEPIRIDE 4 MG/1
8 TABLET ORAL
Qty: 180 TABLET | Refills: 0 | Status: SHIPPED | OUTPATIENT
Start: 2024-09-05

## 2024-09-05 RX ORDER — INSULIN LISPRO 100 [IU]/ML
INJECTION, SOLUTION INTRAVENOUS; SUBCUTANEOUS
Qty: 10 ML | Refills: 2 | Status: CANCELLED | OUTPATIENT
Start: 2024-09-05

## 2024-09-05 RX ORDER — INSULIN GLARGINE 100 [IU]/ML
83 INJECTION, SOLUTION SUBCUTANEOUS DAILY
Qty: 10 EACH | Refills: 2 | Status: SHIPPED | OUTPATIENT
Start: 2024-09-05

## 2024-09-05 RX ORDER — INSULIN LISPRO 100 [IU]/ML
INJECTION, SOLUTION INTRAVENOUS; SUBCUTANEOUS
Qty: 15 EACH | Refills: 2 | Status: SHIPPED | OUTPATIENT
Start: 2024-09-05

## 2024-09-05 RX ORDER — INSULIN GLARGINE 100 [IU]/ML
80 INJECTION, SOLUTION SUBCUTANEOUS DAILY
Qty: 3 ML | Refills: 0 | Status: CANCELLED | OUTPATIENT
Start: 2024-09-05

## 2024-09-05 RX ORDER — ERGOCALCIFEROL 1.25 MG/1
50000 CAPSULE ORAL WEEKLY
Qty: 12 CAPSULE | Refills: 0 | Status: SHIPPED | OUTPATIENT
Start: 2024-09-05

## 2024-09-05 ASSESSMENT — PATIENT HEALTH QUESTIONNAIRE - PHQ9
1. LITTLE INTEREST OR PLEASURE IN DOING THINGS: NOT AT ALL
2. FEELING DOWN, DEPRESSED OR HOPELESS: NOT AT ALL
SUM OF ALL RESPONSES TO PHQ9 QUESTIONS 1 AND 2: 0

## 2024-09-05 NOTE — PROGRESS NOTES
"Subjective   Patient ID: Marco Antonio Gill is a 73 y.o. male who presents for Follow-up.    HPI     Patient is upset he only got 10 days worth of insulin.     Labs & PSA: 09/03/2024        He has hypertension.  Pt does monitor his BP.  He is compliant with his antihypertensive therapy denies any side effects.   He denies CP, SOB, and dizziness.      Patient has hyperlipidemia.  He is treated with Atorvastatin.   Pt tries to limit the amount of fatty foods and high cholesterol foods that he consumes  Patient is compliant with his atorvastatin therapy and denies any noted side effects.      Pt has DM Type 2 and is insulin dependent.   He monitors his blood glucose and keeps a log of his readings.  Patient does try to limit the amount of carbohydrates that are consumed.  He denies any hypoglycemic symptoms or readings.  Also denies any polyuria, any tingling in the extremities.     He had a stroke in 2016 and is currently taking aspirin for prophylaxis.   He was previously treated with Plavix, but discontinued it on his own in September 2016 as he felt that it made him feel \"drugged.\"  Pt has refused any additional antiplatelet medication beyond the aspirin.      Pt understands with verbalization that vaccines all have risks (to include: local reaction, systemic reaction). Pt has reviewed the VIS (Vaccine information sheet) and gives consent to have this vaccination despite the risks.    Review of Systems  Constitutional: Patient denies any fever, chills, loss of appetite, or unexplained weight loss.  Cardiovascular: Patient denies any chest pain, shortness of breath with exertion, tachycardia, palpitations, orthopnea, or paroxysmal nocturnal dyspnea.  Respiratory: Patient denies any cough, shortness breath, or wheezing.  Gastrointestinal: Patient denies any nausea, vomiting, diarrhea, constipation, melena, hematochezia, or reflux symptoms  Skin: Denies any rashes or skin lesions  Neurology: Patient denies any new motor " "or sensory losses. Denies any numbness, tingling, weakness, and incoordination of the extremities. Patient also denies any tremor, seizures, or gait instability.  Endocrinology: Denies any polyuria, polydipsia, polyphagia, or heat/cold intolerance.      Objective   /64   Pulse 80   Temp 36.7 °C (98.1 °F) (Temporal)   Ht 1.727 m (5' 8\")   Wt 105 kg (231 lb 3.2 oz)   SpO2 96%   BMI 35.15 kg/m²     Physical Exam  General Appearance: Alert and cooperative, in no acute distress, well-developed/well-nourished, obese male.     Neck: Supple and without adenopathy or rigidity. There is no JVD at 90° and no carotid bruits are noted. There is no thyromegaly, thyroid tenderness, or palpable thyroid nodules.  Heart: Regular rate and rhythm without murmur or ectopy.  Lungs: Clear to auscultation bilaterally with good air exchange.  Skin: Good turgor, moist, warm and without rashes or lesions.  Neurological exam: Alert and oriented ×3, no tremor, normal gait.  Extremities: No clubbing, cyanosis, or edema      Assessment/Plan   HTN:  BP at goal.  Declines any changes to his BP medications despite my recommendations.     Hyperlipidemia: Stable based on last labs.  Patient will continue with the current medication, Atorvastatin.   Dietary changes, exercise, and maintenance of healthy weight were discussed at length.     Diabetes mellitus type 2 with neuropathy: Most recent A1c was:  Lab Results   Component Value Date    HGBA1C 8.2 (H) 09/03/2024 7/11/2022: We increased Lantus from 58 to 62 units daily. We increased Humalog from 21 to 23 units twice daily with meals.  10/11/22: We increased the dosing of his Humalog to 25 units twice a day with meals.  1/10/2023: Recommended continuous glucose monitor to pt. He stated he will consider this at his next visit in April.  Pt was advised to add 15 units of Humalog with his lunch.  5/2/2023: We will change the Humalog to 25/15/25 Units with meals. Continue Lantus at 70 Units " "once daily.  8/1/2023: Pt has once again declined continuous glucose monitor and will consider at next visit in November. Increased Lantus from 70 units to 75 units QD. Increased Humalog from 25/15/25 units to 27/27/27 units with meals.  Pt previously stopped metformin and refused to restart it as he believes it is \"not good\" for him.  Dietary changes, exercise, and maintenance of a healthy weight were discussed at length.  Patient was advised to have a diabetic eye exam annually.  Patient was also advised to check the feet on a daily basis.  10/31/2023:  Changed Humalog to 27/28/27 units with meals.  1/31/2024: Will change Humalog to 32/29/32 units with meals   6/5/2024:  A1c remains elevated. Will change Humalog to 34/31/34 units with meals. Increased Lantus from 75 units to 80 units once a day.  9/5/2024: We will change the Humalog to 36/33/36 units with meals. Increased the Lantus from 80 units to 83 units.      Hx of stroke:   He will continue on daily aspirin therapy.   He discontinued Plavix in 2016 as it made him feel \"drugged\" and he wishes to remain on ASA only.  He has refused any additional anticoagulation despite my advice and encouragement.     Vitamin D deficiency:   Vitamin D is still suppressed on most recent labs.   Will prescribe 41852 UT of Vitamin D to be taken once per week.  - cholecalciferol (Vitamin D-3) (62392 UT) tablet; Take 1 capsule (50,000 units) by mouth 1 (one) time per week.      Obesity:  Diet and exercise discussed.  BMI goal of 25.    Need for vaccination:  Flu vaccine administered today.    Increased PSA:  His PSA increased from 2.54 to 3.55 in 1 year.   We will recheck PSA in 6 months.           MCAW DUE 1/2025  Refuses colon cancer screening.  PSA due 9/4/2025    Follow up in 3 months.      Scribe Attestation  By signing my name below, I, Ann Meyers   attest that this documentation has been prepared under the direction and in the presence of Rm Minaya, " DO.    Orders Placed This Encounter   Procedures    Flu vaccine, trivalent, preservative free, HIGH-DOSE, age 65y+ (Fluzone)    Hemoglobin A1C    Basic Metabolic Panel    Vitamin D 25-Hydroxy,Total (for eval of Vitamin D levels)    Albumin-Creatinine Ratio, Urine Random     Requested Prescriptions     Signed Prescriptions Disp Refills    glimepiride (Amaryl) 4 mg tablet 180 tablet 0     Sig: Take 2 tablets (8 mg) by mouth once daily in the morning. Take before meals.    ergocalciferol (Vitamin D-2) 1.25 MG (82758 UT) capsule 12 capsule 0     Sig: Take 1 capsule (50,000 Units) by mouth 1 (one) time per week.

## 2024-09-05 NOTE — PATIENT INSTRUCTIONS
Increase the Lantus to 83 units daily.  Increase the Humalog to 36/33/36    The PSA has increased more than expected so we jia recheck it in 6 months.      Follow up in 3 months with labs to be done PRIOR.    It was a pleasure to see you today. Thank you for choosing us for your health care needs.    If you have lab or other testing completed and have not been informed of results within one week, please call the office for your results.    If you haven't done so, consider signing up for [facility] [employer], the [facility] personal health record. Ask the staff how you can get started.

## 2024-10-21 DIAGNOSIS — E78.2 MIXED HYPERLIPIDEMIA: ICD-10-CM

## 2024-10-21 RX ORDER — ATORVASTATIN CALCIUM 20 MG/1
20 TABLET, FILM COATED ORAL DAILY
Qty: 90 TABLET | Refills: 0 | Status: SHIPPED | OUTPATIENT
Start: 2024-10-21

## 2024-10-21 NOTE — TELEPHONE ENCOUNTER
PATIENT ALSO REQUESTED INSULIN LISPRO. LMOM INFORMING PT LISPRO WAS SENT IN SEPTEMBER WITH 2 REFILLS TO CALL PHARMACY AND REQUEST THE REFILL.

## 2024-10-24 DIAGNOSIS — Z79.4 TYPE 2 DIABETES MELLITUS WITH DIABETIC POLYNEUROPATHY, WITH LONG-TERM CURRENT USE OF INSULIN: ICD-10-CM

## 2024-10-24 DIAGNOSIS — E11.42 TYPE 2 DIABETES MELLITUS WITH DIABETIC POLYNEUROPATHY, WITH LONG-TERM CURRENT USE OF INSULIN: ICD-10-CM

## 2024-10-24 RX ORDER — INSULIN LISPRO 100 [IU]/ML
INJECTION, SOLUTION INTRAVENOUS; SUBCUTANEOUS
Qty: 15 EACH | Refills: 2 | Status: SHIPPED | OUTPATIENT
Start: 2024-10-24

## 2024-11-13 DIAGNOSIS — I10 PRIMARY HYPERTENSION: ICD-10-CM

## 2024-11-13 RX ORDER — LISINOPRIL AND HYDROCHLOROTHIAZIDE 12.5; 2 MG/1; MG/1
2 TABLET ORAL DAILY
Qty: 180 TABLET | Refills: 0 | Status: SHIPPED | OUTPATIENT
Start: 2024-11-13

## 2024-12-06 ENCOUNTER — OFFICE VISIT (OUTPATIENT)
Dept: URGENT CARE | Age: 74
End: 2024-12-06
Payer: MEDICARE

## 2024-12-06 VITALS
DIASTOLIC BLOOD PRESSURE: 80 MMHG | HEIGHT: 68 IN | HEART RATE: 104 BPM | BODY MASS INDEX: 33.34 KG/M2 | WEIGHT: 220 LBS | RESPIRATION RATE: 18 BRPM | TEMPERATURE: 98.9 F | SYSTOLIC BLOOD PRESSURE: 154 MMHG | OXYGEN SATURATION: 98 %

## 2024-12-06 DIAGNOSIS — H10.89 OTHER CONJUNCTIVITIS OF BOTH EYES: ICD-10-CM

## 2024-12-06 DIAGNOSIS — J01.00 ACUTE NON-RECURRENT MAXILLARY SINUSITIS: Primary | ICD-10-CM

## 2024-12-06 RX ORDER — AMOXICILLIN AND CLAVULANATE POTASSIUM 875; 125 MG/1; MG/1
875 TABLET, FILM COATED ORAL 2 TIMES DAILY
Qty: 20 TABLET | Refills: 0 | Status: SHIPPED | OUTPATIENT
Start: 2024-12-06 | End: 2024-12-16

## 2024-12-06 RX ORDER — KETOROLAC TROMETHAMINE 4 MG/ML
1 SOLUTION/ DROPS OPHTHALMIC EVERY 6 HOURS PRN
Qty: 5 ML | Refills: 0 | Status: SHIPPED | OUTPATIENT
Start: 2024-12-06 | End: 2024-12-11

## 2024-12-06 ASSESSMENT — VISUAL ACUITY: OU: 1

## 2024-12-06 NOTE — PROGRESS NOTES
"Subjective   Patient ID: Marco Antonio Gill is a 74 y.o. male who is here with his wife. He presents today with a chief complaint of Conjunctivitis (Pink eye for 2 days, congestion 2 days prior. ). Patient presents with a 1 week history of nasal congestion and sinus pressure. He had been using Cesar's VapoRub under his nose. He then applied it in his eyes 2 days ago, which caused burning and eye redness, so now he thinks he has pink eye. He denies visual changes.    History of Present Illness    Conjunctivitis      Past Medical History  Allergies as of 12/06/2024 - Reviewed 12/06/2024   Allergen Reaction Noted    Pioglitazone Shortness of breath and Other 04/11/2023    Amlodipine Other 04/11/2023    Atenolol Other 04/11/2023    Carvedilol Other 04/11/2023    Pravastatin Other 04/11/2023       (Not in a hospital admission)       Past Medical History:   Diagnosis Date    Acute hematogenous osteomyelitis of left foot (Multi) 04/06/2023    Bacteremia 04/06/2023    Ischemic stroke (Multi) 08/28/2016    Personal history of transient ischemic attack (TIA), and cerebral infarction without residual deficits 09/19/2016    History of stroke    Personal history of transient ischemic attack (TIA), and cerebral infarction without residual deficits 10/11/2022    Arterial ischemic stroke, remote, resolved       Past Surgical History:   Procedure Laterality Date    OTHER SURGICAL HISTORY  02/20/2021    No history of surgery        reports that he has never smoked. He has never used smokeless tobacco. He reports that he does not drink alcohol and does not use drugs.    Review of Systems  Review of Systems                               Objective    Vitals:    12/06/24 1542   BP: 154/80   Pulse: 104   Resp: 18   Temp: 37.2 °C (98.9 °F)   TempSrc: Oral   SpO2: 98%   Weight: 99.8 kg (220 lb)   Height: 1.727 m (5' 8\")     No LMP for male patient.    Physical Exam  Constitutional:       Appearance: He is obese.   HENT:      Right Ear: Tympanic " membrane, ear canal and external ear normal.      Left Ear: Tympanic membrane, ear canal and external ear normal.      Nose: Congestion present.      Mouth/Throat:      Mouth: Mucous membranes are moist.   Eyes:      General: Lids are normal. Lids are everted, no foreign bodies appreciated. Vision grossly intact. Gaze aligned appropriately. No visual field deficit.     Extraocular Movements: Extraocular movements intact.      Conjunctiva/sclera:      Right eye: Right conjunctiva is injected.      Left eye: Left conjunctiva is injected.      Pupils: Pupils are equal, round, and reactive to light.      Comments: No corneal defects or foreign bodies visible with fluorescein staining bilaterally   Neurological:      Mental Status: He is alert.         Procedures    Point of Care Test & Imaging Results from this visit  No results found for this visit on 12/06/24.   No results found.    Diagnostic study results (if any) were reviewed by Anahi Horn MD.    Assessment/Plan   Allergies, medications, history, and pertinent labs/EKGs/Imaging reviewed by Anahi Horn MD.     Orders and Diagnoses  There are no diagnoses linked to this encounter.    Medical Admin Record      Patient disposition: Home    Electronically signed by Anahi Horn MD  3:46 PM

## 2024-12-06 NOTE — PATIENT INSTRUCTIONS
Avoid applying Cesar's VaboRub in eyes anymore.  Ketorolac eye drops as needed.  Take Augmentin as prescribed, orally, with food.  Nasal saline, nasally, as directed.  Follow up with your Primary Care Provider.

## 2024-12-13 ENCOUNTER — LAB (OUTPATIENT)
Dept: LAB | Facility: LAB | Age: 74
End: 2024-12-13
Payer: MEDICARE

## 2024-12-13 DIAGNOSIS — E11.42 TYPE 2 DIABETES MELLITUS WITH DIABETIC POLYNEUROPATHY, WITH LONG-TERM CURRENT USE OF INSULIN: ICD-10-CM

## 2024-12-13 DIAGNOSIS — E55.9 VITAMIN D DEFICIENCY: ICD-10-CM

## 2024-12-13 DIAGNOSIS — I10 PRIMARY HYPERTENSION: ICD-10-CM

## 2024-12-13 DIAGNOSIS — Z79.4 TYPE 2 DIABETES MELLITUS WITH DIABETIC POLYNEUROPATHY, WITH LONG-TERM CURRENT USE OF INSULIN: ICD-10-CM

## 2024-12-13 LAB
25(OH)D3 SERPL-MCNC: 20 NG/ML (ref 30–100)
ANION GAP SERPL CALC-SCNC: 11 MMOL/L (ref 10–20)
BUN SERPL-MCNC: 33 MG/DL (ref 6–23)
CALCIUM SERPL-MCNC: 8.3 MG/DL (ref 8.6–10.3)
CHLORIDE SERPL-SCNC: 106 MMOL/L (ref 98–107)
CO2 SERPL-SCNC: 26 MMOL/L (ref 21–32)
CREAT SERPL-MCNC: 1.15 MG/DL (ref 0.5–1.3)
EGFRCR SERPLBLD CKD-EPI 2021: 66 ML/MIN/1.73M*2
GLUCOSE SERPL-MCNC: 179 MG/DL (ref 74–99)
POTASSIUM SERPL-SCNC: 4.2 MMOL/L (ref 3.5–5.3)
SODIUM SERPL-SCNC: 139 MMOL/L (ref 136–145)

## 2024-12-13 PROCEDURE — 83036 HEMOGLOBIN GLYCOSYLATED A1C: CPT

## 2024-12-13 PROCEDURE — 82306 VITAMIN D 25 HYDROXY: CPT

## 2024-12-13 PROCEDURE — 80048 BASIC METABOLIC PNL TOTAL CA: CPT

## 2024-12-13 PROCEDURE — 36415 COLL VENOUS BLD VENIPUNCTURE: CPT

## 2024-12-14 LAB
EST. AVERAGE GLUCOSE BLD GHB EST-MCNC: 189 MG/DL
HBA1C MFR BLD: 8.2 %

## 2024-12-17 ENCOUNTER — APPOINTMENT (OUTPATIENT)
Dept: PRIMARY CARE | Facility: CLINIC | Age: 74
End: 2024-12-17
Payer: MEDICARE

## 2024-12-17 VITALS
HEIGHT: 68 IN | DIASTOLIC BLOOD PRESSURE: 68 MMHG | SYSTOLIC BLOOD PRESSURE: 144 MMHG | HEART RATE: 79 BPM | WEIGHT: 234.7 LBS | TEMPERATURE: 97.9 F | OXYGEN SATURATION: 93 % | BODY MASS INDEX: 35.57 KG/M2

## 2024-12-17 DIAGNOSIS — E55.9 VITAMIN D DEFICIENCY: ICD-10-CM

## 2024-12-17 DIAGNOSIS — E66.812 CLASS 2 SEVERE OBESITY WITH SERIOUS COMORBIDITY AND BODY MASS INDEX (BMI) OF 35.0 TO 35.9 IN ADULT, UNSPECIFIED OBESITY TYPE: ICD-10-CM

## 2024-12-17 DIAGNOSIS — Z79.4 TYPE 2 DIABETES MELLITUS WITH FOOT ULCER, WITH LONG-TERM CURRENT USE OF INSULIN: ICD-10-CM

## 2024-12-17 DIAGNOSIS — L97.509 TYPE 2 DIABETES MELLITUS WITH FOOT ULCER, WITH LONG-TERM CURRENT USE OF INSULIN: ICD-10-CM

## 2024-12-17 DIAGNOSIS — E11.621 TYPE 2 DIABETES MELLITUS WITH FOOT ULCER, WITH LONG-TERM CURRENT USE OF INSULIN: ICD-10-CM

## 2024-12-17 DIAGNOSIS — I10 PRIMARY HYPERTENSION: Primary | ICD-10-CM

## 2024-12-17 DIAGNOSIS — E66.01 CLASS 2 SEVERE OBESITY WITH SERIOUS COMORBIDITY AND BODY MASS INDEX (BMI) OF 35.0 TO 35.9 IN ADULT, UNSPECIFIED OBESITY TYPE: ICD-10-CM

## 2024-12-17 DIAGNOSIS — E78.2 MIXED HYPERLIPIDEMIA: ICD-10-CM

## 2024-12-17 DIAGNOSIS — Z79.4 TYPE 2 DIABETES MELLITUS WITH DIABETIC POLYNEUROPATHY, WITH LONG-TERM CURRENT USE OF INSULIN: ICD-10-CM

## 2024-12-17 DIAGNOSIS — Z86.73 HISTORY OF STROKE: ICD-10-CM

## 2024-12-17 DIAGNOSIS — R97.20 INCREASED PROSTATE SPECIFIC ANTIGEN (PSA) VELOCITY: ICD-10-CM

## 2024-12-17 DIAGNOSIS — E11.42 TYPE 2 DIABETES MELLITUS WITH DIABETIC POLYNEUROPATHY, WITH LONG-TERM CURRENT USE OF INSULIN: ICD-10-CM

## 2024-12-17 DIAGNOSIS — Z11.59 NEED FOR HEPATITIS C SCREENING TEST: ICD-10-CM

## 2024-12-17 PROCEDURE — 3077F SYST BP >= 140 MM HG: CPT | Performed by: FAMILY MEDICINE

## 2024-12-17 PROCEDURE — 3078F DIAST BP <80 MM HG: CPT | Performed by: FAMILY MEDICINE

## 2024-12-17 PROCEDURE — G2211 COMPLEX E/M VISIT ADD ON: HCPCS | Performed by: FAMILY MEDICINE

## 2024-12-17 PROCEDURE — 1159F MED LIST DOCD IN RCRD: CPT | Performed by: FAMILY MEDICINE

## 2024-12-17 PROCEDURE — 99214 OFFICE O/P EST MOD 30 MIN: CPT | Performed by: FAMILY MEDICINE

## 2024-12-17 PROCEDURE — 1160F RVW MEDS BY RX/DR IN RCRD: CPT | Performed by: FAMILY MEDICINE

## 2024-12-17 RX ORDER — CHOLECALCIFEROL (VITAMIN D3) 50 MCG
TABLET ORAL
Qty: 13 TABLET | Refills: 0 | Status: CANCELLED | OUTPATIENT
Start: 2024-12-17

## 2024-12-17 RX ORDER — ERGOCALCIFEROL 1.25 MG/1
50000 CAPSULE ORAL 2 TIMES WEEKLY
Qty: 24 CAPSULE | Refills: 0 | Status: SHIPPED | OUTPATIENT
Start: 2024-12-19

## 2024-12-17 RX ORDER — PEN NEEDLE, DIABETIC 30 GX3/16"
NEEDLE, DISPOSABLE MISCELLANEOUS
Qty: 400 EACH | Refills: 3 | Status: SHIPPED | OUTPATIENT
Start: 2024-12-17

## 2024-12-17 RX ORDER — GLIMEPIRIDE 4 MG/1
8 TABLET ORAL
Qty: 180 TABLET | Refills: 0 | Status: SHIPPED | OUTPATIENT
Start: 2024-12-17

## 2024-12-17 RX ORDER — INSULIN LISPRO 100 [IU]/ML
INJECTION, SOLUTION INTRAVENOUS; SUBCUTANEOUS
Qty: 15 EACH | Refills: 2 | Status: SHIPPED | OUTPATIENT
Start: 2024-12-17

## 2024-12-17 NOTE — PROGRESS NOTES
"Subjective   Patient ID: Marco Antonio Gill is a 74 y.o. male who presents for Follow-up.    HPI     Pt declines Covid vaccine.     Pt states he ran out of the 50,000 unit Vitamin D one week ago.  Pt states he start taking over the counter Vitamin D supplementation instead.     Pt states he had a couple blood sugar readings in the 50s and 60s since last visit.   Usually related to poor oral intake.      No new concerns   Labs: 12/13/2024  PSA: 09/03/2024      He has hypertension.  Pt does monitor his BP.  He is compliant with his antihypertensive therapy denies any side effects.   He denies CP, SOB, and dizziness.      Patient has hyperlipidemia.  He is treated with Atorvastatin.   Pt tries to limit the amount of fatty foods and high cholesterol foods that he consumes  Patient is compliant with his atorvastatin therapy and denies any noted side effects.      Pt has DM Type 2 and is insulin dependent.   He monitors his blood glucose and keeps a log of his readings.  Patient does try to limit the amount of carbohydrates that are consumed.  He does get occasional hypoglycemic symptoms / readings and is able to treat with oral intake.  Denies any polyuria.     He had a stroke in 2016 and is currently taking aspirin for prophylaxis.   He was previously treated with Plavix, but discontinued it on his own in September 2016 as he felt that it made him feel \"drugged.\"  Pt has refused any additional antiplatelet medication beyond the aspirin.          Review of Systems  Constitutional: Patient denies any fever, chills, loss of appetite, or unexplained weight loss.  Cardiovascular: Patient denies any chest pain, shortness of breath with exertion, tachycardia, palpitations, orthopnea, or paroxysmal nocturnal dyspnea.  Respiratory: Patient denies any cough, shortness of breath, or wheezing.  Gastrointestinal: Patient denies any nausea, vomiting, diarrhea, constipation, melena, hematochezia, or reflux symptoms  Skin: Denies any " "rashes or skin lesions  Neurology: Patient denies any new motor or sensory losses. Denies any numbness, tingling, weakness, and incoordination of the extremities. Patient also denies any tremor, seizures, or gait instability.  Endocrinology: Denies any polyuria, polydipsia, polyphagia, or heat/cold intolerance.      Objective   /68   Pulse 79   Temp 36.6 °C (97.9 °F) (Temporal)   Ht 1.727 m (5' 8\")   Wt 106 kg (234 lb 11.2 oz)   SpO2 93%   BMI 35.69 kg/m²     Physical Exam  General Appearance: Alert and cooperative, in no acute distress, well-developed/well-nourished, obese male.     Neck: Supple and without adenopathy or rigidity. There is no JVD at 90° and no carotid bruits are noted. There is no thyromegaly, thyroid tenderness, or palpable thyroid nodules.  Heart: Regular rate and rhythm without murmur or ectopy.  Lungs: Clear to auscultation bilaterally with good air exchange.  Skin: Good turgor, moist, warm and without rashes or lesions.  Neurological exam: Alert and oriented ×3, no tremor, normal gait.  Extremities: No clubbing, cyanosis, or edema      Assessment/Plan     HTN:  BP is not adequately controlled based on in office readings.  Declines any changes to his BP medications despite my recommendations.     Hyperlipidemia:   Stable based on last labs.  Patient will continue with the current medication, Atorvastatin.   Dietary changes, exercise, and maintenance of healthy weight were discussed at length.     Diabetes mellitus type 2 with neuropathy: Most recent A1c was:  Lab Results   Component Value Date    HGBA1C 8.2 (H) 12/13/2024 7/11/2022: We increased Lantus from 58 to 62 units daily. We increased Humalog from 21 to 23 units twice daily with meals.  10/11/22: We increased the dosing of his Humalog to 25 units twice a day with meals.  1/10/2023: Recommended continuous glucose monitor to pt. He stated he will consider this at his next visit in April.  Pt was advised to add 15 units of " "Humalog with his lunch.  5/2/2023: We will change the Humalog to 25/15/25 Units with meals. Continue Lantus at 70 Units once daily.  8/1/2023: Pt has once again declined continuous glucose monitor and will consider at next visit in November. Increased Lantus from 70 units to 75 units QD. Increased Humalog from 25/15/25 units to 27/27/27 units with meals.  Pt previously stopped metformin and refused to restart it as he believes it is \"not good\" for him.  Dietary changes, exercise, and maintenance of a healthy weight were discussed at length.  Patient was advised to have a diabetic eye exam annually.  Patient was also advised to check the feet on a daily basis.  10/31/2023:  Changed Humalog to 27/28/27 units with meals.  1/31/2024: Will change Humalog to 32/29/32 units with meals   6/5/2024:  A1c remains elevated. Will change Humalog to 34/31/34 units with meals. Increased Lantus from 75 units to 80 units once a day.  9/5/2024: We will change the Humalog to 36/33/36 units with meals. Increased the Lantus from 80 units to 83 units.   12/17/2024:  A1c is unchanged at 8.2%. We will change the Humalog to 40/33/40 units with meals.     Hx of stroke:   He will continue on daily aspirin therapy.   He discontinued Plavix in 2016 as it made him feel \"drugged\" and he wishes to remain on ASA only.  He has refused any additional anticoagulation despite my advice and encouragement.     Vitamin D deficiency:   Vitamin D was low on most recent labs.   12/17/2024: Currently on 50,000 units of vitamin D weekly,  but Vit D has declined further.  Will increase the dose of the vitamin D supplementation to [address] U twice per week.  Will recheck Vitamin D with his next labs.     Obesity:   Dietary changes, exercise, and maintenance of a healthy weight were discussed at length.  Goal is to achieve a BMI less than 25.    Increased PSA:  His PSA increased from 2.54 to 3.55 in one year.   We will recheck PSA with his 3/2025 labs.    Need for " "hepatitis C screening test:  Ordered hepatitis C antibody to be done with his next labs of importance to Medicare recommendations to screen everyone born between 1947 and 1965.       MCAW DUE 1/2025  Refuses colon cancer screening.  PSA due 9/4/2025    Follow up in 3 months for Medicare wellness visit.       Scribe Attestation  By signing my name below, I, Duglas Ann Silverman   attest that this documentation has been prepared under the direction and in the presence of Rm Minaya DO.    Orders Placed This Encounter   Procedures    Hemoglobin A1C    Lipid Panel    Comprehensive Metabolic Panel    Vitamin D 25-Hydroxy,Total (for eval of Vitamin D levels)    PSA, total and free    Hepatitis C Antibody     Requested Prescriptions     Signed Prescriptions Disp Refills    glimepiride (Amaryl) 4 mg tablet 180 tablet 0     Sig: Take 2 tablets (8 mg) by mouth once daily in the morning. Take before meals.    pen needle, diabetic 31 gauge x 3/16\" needle 400 each 3     Sig: Use new needle to inject insulin 4 times per day as directed    insulin lispro (HumaLOG) 100 unit/mL injection 15 each 2     Sig: Inject 40 units under the skin before breakfast, 33 units before lunch, and 40 units before evening meal.    ergocalciferol (Vitamin D-2) 1.25 MG (53948 UT) capsule 24 capsule 0     Sig: Take 1 capsule (50,000 Units) by mouth 2 times a week.       "

## 2024-12-17 NOTE — PATIENT INSTRUCTIONS
Follow up in 3 months with labs to be done PRIOR.    We increased the vitamin to twice per week.  We increased the dose of your humalog.    It was a pleasure to see you today. Thank you for choosing us for your health care needs.    If you have lab or other testing completed and have not been informed of results within one week, please call the office for your results.    If you haven't done so, consider signing up for [facility] [employer], the [facility] personal health record. Ask the staff how you can get started.

## 2025-01-02 DIAGNOSIS — I10 PRIMARY HYPERTENSION: ICD-10-CM

## 2025-01-02 RX ORDER — LISINOPRIL AND HYDROCHLOROTHIAZIDE 12.5; 2 MG/1; MG/1
2 TABLET ORAL DAILY
Qty: 180 TABLET | Refills: 0 | OUTPATIENT
Start: 2025-01-02

## 2025-01-10 DIAGNOSIS — E11.42 TYPE 2 DIABETES MELLITUS WITH DIABETIC POLYNEUROPATHY, WITH LONG-TERM CURRENT USE OF INSULIN: ICD-10-CM

## 2025-01-10 DIAGNOSIS — Z79.4 TYPE 2 DIABETES MELLITUS WITH DIABETIC POLYNEUROPATHY, WITH LONG-TERM CURRENT USE OF INSULIN: ICD-10-CM

## 2025-01-13 RX ORDER — INSULIN GLARGINE 100 [IU]/ML
83 INJECTION, SOLUTION SUBCUTANEOUS DAILY
Qty: 10 EACH | Refills: 2 | Status: SHIPPED | OUTPATIENT
Start: 2025-01-13

## 2025-01-18 DIAGNOSIS — E78.2 MIXED HYPERLIPIDEMIA: ICD-10-CM

## 2025-01-18 RX ORDER — ATORVASTATIN CALCIUM 20 MG/1
20 TABLET, FILM COATED ORAL DAILY
Qty: 90 TABLET | Refills: 0 | Status: SHIPPED | OUTPATIENT
Start: 2025-01-18

## 2025-01-23 DIAGNOSIS — Z79.4 TYPE 2 DIABETES MELLITUS WITH DIABETIC POLYNEUROPATHY, WITH LONG-TERM CURRENT USE OF INSULIN: ICD-10-CM

## 2025-01-23 DIAGNOSIS — E11.42 TYPE 2 DIABETES MELLITUS WITH DIABETIC POLYNEUROPATHY, WITH LONG-TERM CURRENT USE OF INSULIN: ICD-10-CM

## 2025-01-23 RX ORDER — IBUPROFEN 200 MG
CAPSULE ORAL
Qty: 400 STRIP | Refills: 3 | Status: SHIPPED | OUTPATIENT
Start: 2025-01-23

## 2025-02-04 DIAGNOSIS — E11.42 TYPE 2 DIABETES MELLITUS WITH DIABETIC POLYNEUROPATHY, WITH LONG-TERM CURRENT USE OF INSULIN: ICD-10-CM

## 2025-02-04 DIAGNOSIS — Z79.4 TYPE 2 DIABETES MELLITUS WITH DIABETIC POLYNEUROPATHY, WITH LONG-TERM CURRENT USE OF INSULIN: ICD-10-CM

## 2025-02-04 RX ORDER — IBUPROFEN 200 MG
CAPSULE ORAL
Qty: 300 STRIP | Refills: 0 | Status: SHIPPED | OUTPATIENT
Start: 2025-02-04

## 2025-02-12 DIAGNOSIS — E11.42 TYPE 2 DIABETES MELLITUS WITH DIABETIC POLYNEUROPATHY, WITH LONG-TERM CURRENT USE OF INSULIN: ICD-10-CM

## 2025-02-12 DIAGNOSIS — I10 PRIMARY HYPERTENSION: ICD-10-CM

## 2025-02-12 DIAGNOSIS — Z79.4 TYPE 2 DIABETES MELLITUS WITH DIABETIC POLYNEUROPATHY, WITH LONG-TERM CURRENT USE OF INSULIN: ICD-10-CM

## 2025-02-13 RX ORDER — INSULIN GLARGINE 100 [IU]/ML
83 INJECTION, SOLUTION SUBCUTANEOUS DAILY
Qty: 10 EACH | Refills: 2 | Status: SHIPPED | OUTPATIENT
Start: 2025-02-13

## 2025-02-13 RX ORDER — LISINOPRIL AND HYDROCHLOROTHIAZIDE 12.5; 2 MG/1; MG/1
2 TABLET ORAL DAILY
Qty: 180 TABLET | Refills: 0 | Status: SHIPPED | OUTPATIENT
Start: 2025-02-13

## 2025-03-14 ENCOUNTER — TELEPHONE (OUTPATIENT)
Dept: PRIMARY CARE | Facility: CLINIC | Age: 75
End: 2025-03-14
Payer: MEDICARE

## 2025-03-14 DIAGNOSIS — E11.42 TYPE 2 DIABETES MELLITUS WITH DIABETIC POLYNEUROPATHY, WITH LONG-TERM CURRENT USE OF INSULIN: ICD-10-CM

## 2025-03-14 DIAGNOSIS — Z79.4 TYPE 2 DIABETES MELLITUS WITH DIABETIC POLYNEUROPATHY, WITH LONG-TERM CURRENT USE OF INSULIN: ICD-10-CM

## 2025-03-14 RX ORDER — INSULIN LISPRO 100 [IU]/ML
INJECTION, SOLUTION INTRAVENOUS; SUBCUTANEOUS
Qty: 15 ML | Refills: 0 | Status: SHIPPED | OUTPATIENT
Start: 2025-03-14 | End: 2025-03-15 | Stop reason: SDUPTHER

## 2025-03-14 RX ORDER — INSULIN LISPRO 100 [IU]/ML
INJECTION, SOLUTION INTRAVENOUS; SUBCUTANEOUS
Qty: 15 EACH | Refills: 0 | Status: SHIPPED | OUTPATIENT
Start: 2025-03-14 | End: 2025-03-14 | Stop reason: SDUPTHER

## 2025-03-14 NOTE — TELEPHONE ENCOUNTER
Patient called because he will be out of his insulin lispro by Monday morning.  We sent in refill for 5 pens to the pharmacy for him and he was advised to call Dr. Minaya his PCP on Monday morning for long-term refill.

## 2025-03-15 ENCOUNTER — TELEPHONE (OUTPATIENT)
Dept: PHARMACY | Facility: HOSPITAL | Age: 75
End: 2025-03-15
Payer: MEDICARE

## 2025-03-15 DIAGNOSIS — E11.42 TYPE 2 DIABETES MELLITUS WITH DIABETIC POLYNEUROPATHY, WITH LONG-TERM CURRENT USE OF INSULIN: ICD-10-CM

## 2025-03-15 DIAGNOSIS — Z79.4 TYPE 2 DIABETES MELLITUS WITH DIABETIC POLYNEUROPATHY, WITH LONG-TERM CURRENT USE OF INSULIN: ICD-10-CM

## 2025-03-15 RX ORDER — INSULIN LISPRO 100 [IU]/ML
INJECTION, SOLUTION INTRAVENOUS; SUBCUTANEOUS
Qty: 15 EACH | Refills: 2 | Status: SHIPPED | OUTPATIENT
Start: 2025-03-15

## 2025-03-15 NOTE — TELEPHONE ENCOUNTER
Advise pt that RX was sent for the normal 15 pens and 2 refills.  He can fill this if he has not filled the RX sent in by the doctor on call.

## 2025-03-15 NOTE — TELEPHONE ENCOUNTER
Patient left voicemail for [facility] last night, 3/14, requesting refill be sent to [facility] for insulin lispro.    LVM x1 for Marco Antonio this morning, 3/15 at 8:30 am. Advised [facility] is not working today but wanted to respond given the urgency of the situation. Advised prescription had been sent last night to [facility] by a covering provider for a 13 day supply, and patient is to call Dr. Minaya's office on Monday, 3/17 for future refills. Recommended patient call [facility] today to ensure prescription can be filled this weekend.    Thank you,  Rubina Stone, PharmD

## 2025-03-18 LAB
25(OH)D3+25(OH)D2 SERPL-MCNC: 49 NG/ML (ref 30–100)
ALBUMIN SERPL-MCNC: 3.6 G/DL (ref 3.6–5.1)
ALP SERPL-CCNC: 52 U/L (ref 35–144)
ALT SERPL-CCNC: 47 U/L (ref 9–46)
ANION GAP SERPL CALCULATED.4IONS-SCNC: 8 MMOL/L (CALC) (ref 7–17)
AST SERPL-CCNC: 32 U/L (ref 10–35)
BILIRUB SERPL-MCNC: 0.8 MG/DL (ref 0.2–1.2)
BUN SERPL-MCNC: 29 MG/DL (ref 7–25)
CALCIUM SERPL-MCNC: 8.6 MG/DL (ref 8.6–10.3)
CHLORIDE SERPL-SCNC: 102 MMOL/L (ref 98–110)
CHOLEST SERPL-MCNC: 123 MG/DL
CHOLEST/HDLC SERPL: 2.6 (CALC)
CO2 SERPL-SCNC: 29 MMOL/L (ref 20–32)
CREAT SERPL-MCNC: 1.18 MG/DL (ref 0.7–1.28)
EGFRCR SERPLBLD CKD-EPI 2021: 64 ML/MIN/1.73M2
EST. AVERAGE GLUCOSE BLD GHB EST-MCNC: 209 MG/DL
EST. AVERAGE GLUCOSE BLD GHB EST-SCNC: 11.6 MMOL/L
GLUCOSE SERPL-MCNC: 224 MG/DL (ref 65–139)
HBA1C MFR BLD: 8.9 % OF TOTAL HGB
HCV AB SERPL QL IA: NORMAL
HDLC SERPL-MCNC: 48 MG/DL
LDLC SERPL CALC-MCNC: 51 MG/DL (CALC)
NONHDLC SERPL-MCNC: 75 MG/DL (CALC)
POTASSIUM SERPL-SCNC: 4.3 MMOL/L (ref 3.5–5.3)
PROT SERPL-MCNC: 6.1 G/DL (ref 6.1–8.1)
PSA FREE MFR SERPL: 21 % (CALC)
PSA FREE SERPL-MCNC: 0.9 NG/ML
PSA SERPL-MCNC: 4.2 NG/ML
SODIUM SERPL-SCNC: 139 MMOL/L (ref 135–146)
TRIGL SERPL-MCNC: 160 MG/DL

## 2025-03-20 PROBLEM — M79.605 PAIN OF LEFT LOWER EXTREMITY: Status: ACTIVE | Noted: 2023-03-24

## 2025-03-20 PROBLEM — L84 CORNS AND CALLOSITIES: Status: ACTIVE | Noted: 2023-07-25

## 2025-03-20 PROBLEM — Z20.822 CONTACT WITH AND (SUSPECTED) EXPOSURE TO COVID-19: Status: ACTIVE | Noted: 2023-04-05

## 2025-03-24 ENCOUNTER — APPOINTMENT (OUTPATIENT)
Dept: PRIMARY CARE | Facility: CLINIC | Age: 75
End: 2025-03-24
Payer: MEDICARE

## 2025-03-24 VITALS
HEIGHT: 68 IN | HEART RATE: 83 BPM | WEIGHT: 240.3 LBS | SYSTOLIC BLOOD PRESSURE: 136 MMHG | OXYGEN SATURATION: 95 % | BODY MASS INDEX: 36.42 KG/M2 | DIASTOLIC BLOOD PRESSURE: 62 MMHG | TEMPERATURE: 98.2 F

## 2025-03-24 DIAGNOSIS — E66.01 CLASS 2 SEVERE OBESITY WITH SERIOUS COMORBIDITY AND BODY MASS INDEX (BMI) OF 35.0 TO 35.9 IN ADULT, UNSPECIFIED OBESITY TYPE: ICD-10-CM

## 2025-03-24 DIAGNOSIS — E11.42 TYPE 2 DIABETES MELLITUS WITH DIABETIC POLYNEUROPATHY, WITH LONG-TERM CURRENT USE OF INSULIN: ICD-10-CM

## 2025-03-24 DIAGNOSIS — E66.812 CLASS 2 SEVERE OBESITY WITH SERIOUS COMORBIDITY AND BODY MASS INDEX (BMI) OF 35.0 TO 35.9 IN ADULT, UNSPECIFIED OBESITY TYPE: ICD-10-CM

## 2025-03-24 DIAGNOSIS — Z00.00 WELL ADULT EXAM: ICD-10-CM

## 2025-03-24 DIAGNOSIS — I10 PRIMARY HYPERTENSION: ICD-10-CM

## 2025-03-24 DIAGNOSIS — E78.2 MIXED HYPERLIPIDEMIA: ICD-10-CM

## 2025-03-24 DIAGNOSIS — R97.20 INCREASED PROSTATE SPECIFIC ANTIGEN (PSA) VELOCITY: ICD-10-CM

## 2025-03-24 DIAGNOSIS — E11.621 TYPE 2 DIABETES MELLITUS WITH FOOT ULCER, WITH LONG-TERM CURRENT USE OF INSULIN: ICD-10-CM

## 2025-03-24 DIAGNOSIS — L97.509 TYPE 2 DIABETES MELLITUS WITH FOOT ULCER, WITH LONG-TERM CURRENT USE OF INSULIN: ICD-10-CM

## 2025-03-24 DIAGNOSIS — E55.9 VITAMIN D DEFICIENCY: ICD-10-CM

## 2025-03-24 DIAGNOSIS — Z79.4 TYPE 2 DIABETES MELLITUS WITH FOOT ULCER, WITH LONG-TERM CURRENT USE OF INSULIN: ICD-10-CM

## 2025-03-24 DIAGNOSIS — Z86.73 HISTORY OF STROKE: ICD-10-CM

## 2025-03-24 DIAGNOSIS — Z00.00 MEDICARE ANNUAL WELLNESS VISIT, SUBSEQUENT: Primary | ICD-10-CM

## 2025-03-24 DIAGNOSIS — Z79.4 TYPE 2 DIABETES MELLITUS WITH DIABETIC POLYNEUROPATHY, WITH LONG-TERM CURRENT USE OF INSULIN: ICD-10-CM

## 2025-03-24 PROCEDURE — 99397 PER PM REEVAL EST PAT 65+ YR: CPT | Performed by: FAMILY MEDICINE

## 2025-03-24 PROCEDURE — 1159F MED LIST DOCD IN RCRD: CPT | Performed by: FAMILY MEDICINE

## 2025-03-24 PROCEDURE — 1160F RVW MEDS BY RX/DR IN RCRD: CPT | Performed by: FAMILY MEDICINE

## 2025-03-24 PROCEDURE — 3078F DIAST BP <80 MM HG: CPT | Performed by: FAMILY MEDICINE

## 2025-03-24 PROCEDURE — 1170F FXNL STATUS ASSESSED: CPT | Performed by: FAMILY MEDICINE

## 2025-03-24 PROCEDURE — G0439 PPPS, SUBSEQ VISIT: HCPCS | Performed by: FAMILY MEDICINE

## 2025-03-24 PROCEDURE — 99214 OFFICE O/P EST MOD 30 MIN: CPT | Performed by: FAMILY MEDICINE

## 2025-03-24 PROCEDURE — 3075F SYST BP GE 130 - 139MM HG: CPT | Performed by: FAMILY MEDICINE

## 2025-03-24 RX ORDER — INSULIN LISPRO 100 [IU]/ML
INJECTION, SOLUTION INTRAVENOUS; SUBCUTANEOUS
Qty: 35 EACH | Refills: 0 | Status: SHIPPED | OUTPATIENT
Start: 2025-03-24

## 2025-03-24 ASSESSMENT — ACTIVITIES OF DAILY LIVING (ADL)
DOING_HOUSEWORK: INDEPENDENT
BATHING: INDEPENDENT
TAKING_MEDICATION: INDEPENDENT
MANAGING_FINANCES: INDEPENDENT
DRESSING: INDEPENDENT
GROCERY_SHOPPING: INDEPENDENT

## 2025-03-24 ASSESSMENT — PATIENT HEALTH QUESTIONNAIRE - PHQ9
2. FEELING DOWN, DEPRESSED OR HOPELESS: NOT AT ALL
1. LITTLE INTEREST OR PLEASURE IN DOING THINGS: NOT AT ALL
SUM OF ALL RESPONSES TO PHQ9 QUESTIONS 1 AND 2: 0

## 2025-03-24 NOTE — PATIENT INSTRUCTIONS
We have written the insulin lispro as a 90 day prescription as you have requested.    I recommend you use compression stocking to minimize the swelling in the lower legs.  [facility] can order these for you.    You have declined to see the endocrinologist.  The blood sugar has increased.  Work on diet changes, exercise, and weight loss.    I am concerned with the elevation of the PSA level.  You have declined to see a urologist to evaluate for prostate cancer.    Follow up in 3 months with labs to be done PRIOR.    It was a pleasure to see you today. Thank you for choosing us for your health care needs.    If you have lab or other testing completed and have not been informed of results within one week, please call the office for your results.    If you haven't done so, consider signing up for [facility] [employer], the [facility] personal health record. Ask the staff how you can get started.

## 2025-03-24 NOTE — PROGRESS NOTES
"Subjective   Reason for Visit: Marco Antonio Gill is an 74 y.o. male here for a Medicare Wellness visit.     Reviewed all medications by prescribing practitioner or clinical pharmacist (such as prescriptions, OTCs, herbal therapies and supplements) and documented in the medical record.    HPI    No new concerns at this time    Labs&PSA: 3/17/2025    He has hypertension.  Pt does monitor his BP.  He is compliant with his antihypertensive therapy denies any side effects.   He denies CP, SOB, and dizziness.      Patient has hyperlipidemia.  He is treated with Atorvastatin.   Pt tries to limit the amount of fatty foods and high cholesterol foods that he consumes  Patient is compliant with his atorvastatin therapy and denies any noted side effects.      Pt has DM Type 2 and is insulin dependent.   He monitors his blood glucose and keeps a log of his readings.  Patient does try to limit the amount of carbohydrates that are consumed.  He does get occasional hypoglycemic symptoms / readings and is able to treat with oral intake.  Denies any polyuria.     He had a stroke in 2016 and is currently taking aspirin for prophylaxis.   He was previously treated with Plavix, but discontinued it on his own in September 2016 as he felt that it made him feel \"drugged.\"  Pt has refused any additional antiplatelet medication beyond the aspirin.     Patient Self Assessment of Health Status  Patient Self Assessment: Good    Nutrition and Exercise  Current Diet: Well Balanced Diet, Unhealthy Diet  Adequate Fluid Intake: Yes  Caffeine: Yes  Exercise Frequency: Infrequently    Functional Ability/Level of Safety  Cognitive Impairment Observed: No cognitive impairment observed  Cognitive Impairment Reported: No cognitive impairment reported by patient or family    Home Safety Risk Factors: None    Patient Care Team:  Rm Minaya DO as PCP - General  Rm Minaya DO as PCP - [employer] PCP     Review of Systems  Constitutional: " "Patient denies any fever, chills, loss of appetite, or unexplained weight loss.  Cardiovascular: Patient denies any chest pain, shortness of breath with exertion, tachycardia, palpitations, orthopnea, or paroxysmal nocturnal dyspnea.  Respiratory: Patient denies any cough, shortness breath, or wheezing.  Gastrointestinal: Patient denies any nausea, vomiting, diarrhea, constipation, melena, hematochezia, or reflux symptoms.  Skin: Denies any rashes or skin lesions.   Neurology: Patient denies any new motor or sensory losses.  Denies any numbness, tingling, weakness, and incoordination of the extremities.  Patient also denies any tremor, seizures, or gait instability.  Endocrinology: Denies any polyuria, polydipsia, polyphagia, or heat/cold intolerance.    SEE HISTORY OF PRESENT ILLNESS ALSO, OTHERWISE ROS IS NEGATIVE TODAY.    Objective   Vitals:  /62   Pulse 83   Temp 36.8 °C (98.2 °F)   Ht 1.727 m (5' 8\")   Wt 109 kg (240 lb 4.8 oz)   SpO2 95%   BMI 36.54 kg/m²       Physical Exam  General Appearance: Alert and cooperative, in no acute distress, well-developed/well-nourished.  Neck: Supple and without adenopathy or rigidity.  There is no JVD at 90° and no carotid bruits are noted.  There is no thyromegaly, thyroid tenderness, or palpable thyroid nodules.  Heart: Regular rate and rhythm without murmur or ectopy.  Respiratory: Lungs are clear to auscultation bilaterally with good air exchange.  Good respiratory effort and no accessory muscle use.  Skin: Good turgor, moist, warm and without rashes or lesions.  Neurological exam: Alert and oriented ×3, no tremor, normal gait.  Extremities: No clubbing, cyanosis, or edema  Head: Normocephalic and atraumatic  Eyes: Conjunctiva and sclera appear normal bilaterally.  Anterior chambers appear clear.  Extraocular muscles are intact.  ENT: Mucous membranes are moist, external auditory canals and tympanic membranes are within normal limits bilaterally.  Pharynx is " without erythema or exudate.  There is no noted rhinorrhea.  Lymph:  No noted cervical, clavicular, axillary, or inguinal adenopathy.  Abdomen: Soft, nontender/nondistended.  No masses, guarding, rebound, or rigidity.  Bowel sounds are normal.  No abdominal bruits.  No CVA tenderness.  There is no widening of the aortic pulsation.  Musculoskeletal: No noted joint effusions or gross bony deformity.      Assessment/Plan     MEDICARE ANNUAL WELLNESS EXAM / ANNUAL PHYSICAL: Appropriate screenings for the patient's current age were discussed at length.  I encouraged a low-fat/low-cholesterol diet and routine exercise.         HTN:  BP is not quite adequately controlled based on in office readings.  Declines any changes to his BP medications despite my recommendations.     Hyperlipidemia:   Stable based on last labs.  Patient will continue with the current medication, Atorvastatin.   Dietary changes, exercise, and maintenance of healthy weight were discussed at length.  Lab Results   Component Value Date    CHOL 123 03/17/2025    CHOL 117 09/03/2024    CHOL 122 01/30/2024     Lab Results   Component Value Date    HDL 48 03/17/2025    HDL 44.0 09/03/2024    HDL 47.7 01/30/2024     Lab Results   Component Value Date    LDLCALC 51 03/17/2025    LDLCALC 48 09/03/2024    LDLCALC 44 01/30/2024     Lab Results   Component Value Date    TRIG 160 (H) 03/17/2025    TRIG 126 09/03/2024    TRIG 151 (H) 01/30/2024       Diabetes mellitus type 2 with neuropathy: Most recent A1c was:  Lab Results   Component Value Date    HGBA1C 8.9 (H) 03/17/2025 7/11/2022: We increased Lantus from 58 to 62 units daily. We increased Humalog from 21 to 23 units twice daily with meals.  10/11/22: We increased the dosing of his Humalog to 25 units twice a day with meals.  1/10/2023: Recommended continuous glucose monitor to pt. He stated he will consider this at his next visit in April.  Pt was advised to add 15 units of Humalog with his  "lunch.  5/2/2023: We will change the Humalog to 25/15/25 Units with meals. Continue Lantus at 70 Units once daily.  8/1/2023: Pt has once again declined continuous glucose monitor and will consider at next visit in November. Increased Lantus from 70 units to 75 units QD. Increased Humalog from 25/15/25 units to 27/27/27 units with meals.  Pt previously stopped metformin and refused to restart it as he believes it is \"not good\" for him.  Dietary changes, exercise, and maintenance of a healthy weight were discussed at length.  Patient was advised to have a diabetic eye exam annually.  Patient was also advised to check the feet on a daily basis.  10/31/2023:  Changed Humalog to 27/28/27 units with meals.  1/31/2024: Will change Humalog to 32/29/32 units with meals   6/5/2024:  A1c remains elevated. Will change Humalog to 34/31/34 units with meals. Increased Lantus from 75 units to 80 units once a day.  9/5/2024: We will change the Humalog to 36/33/36 units with meals. Increased the Lantus from 80 units to 83 units.   12/17/2024:  A1c is unchanged at 8.2%. We will change the Humalog to 40/33/40 units with meals.   3/17/2025: A1c has further increased to 8.9%.  I have recommended changes to his diabetes medications and referral to endocrinology to get opinion on management of his DM.  Pt unfortunately declines both and prefers to work on diet changes.  Risks of uncontrolled DM discussed including the complications of vascular disease, retinopathy, neuropathy, and nephropathy.    Hx of stroke:   He will continue on daily aspirin therapy.   He discontinued Plavix in 2016 as it made him feel \"drugged\" and he wishes to remain on ASA only.  He has refused any additional anticoagulation (beyond ASA) despite my advice and encouragement.     Vitamin D deficiency:   Vit D was normal on last labs.   We will continue the current Vit D supplementation.  Lab Results   Component Value Date    VITD25 49 03/17/2025    VITD25 20 (L) " 12/13/2024    VITD25 29 (L) 09/03/2024         Obesity:   Dietary changes, exercise, and maintenance of a healthy weight were discussed at length.  Goal is to achieve a BMI less than 25.    Increased PSA:  His PSA increased from 2.54 to 3.55 in one year.   3/17/2025 PSA increased further to 4.2.  I recommended we refer to urology for the continued rise of the PSA to evaluate for prostate cancer.  He has declined and instead has requested to repeat testing in another 3 months.  PSA trend:  2.54, 3.55, 4.2       MCAW DUE 2026  Refuses colon cancer screening.  Screening PSA due 9/4/2025      Orders Placed This Encounter   Procedures    Hemoglobin A1C    Basic Metabolic Panel    PSA, total and free     Requested Prescriptions     Signed Prescriptions Disp Refills    insulin lispro (HumaLOG) 100 unit/mL pen 35 each 0     Sig: Inject 40 units under the skin before breakfast, 33 units before lunch, and 40 units before evening meal.

## 2025-05-13 DIAGNOSIS — I10 PRIMARY HYPERTENSION: ICD-10-CM

## 2025-05-13 DIAGNOSIS — E78.2 MIXED HYPERLIPIDEMIA: ICD-10-CM

## 2025-05-13 RX ORDER — ATORVASTATIN CALCIUM 20 MG/1
20 TABLET, FILM COATED ORAL DAILY
Qty: 90 TABLET | Refills: 0 | Status: SHIPPED | OUTPATIENT
Start: 2025-05-13

## 2025-05-13 RX ORDER — LISINOPRIL AND HYDROCHLOROTHIAZIDE 12.5; 2 MG/1; MG/1
2 TABLET ORAL DAILY
Qty: 180 TABLET | Refills: 0 | Status: SHIPPED | OUTPATIENT
Start: 2025-05-13

## 2025-05-16 DIAGNOSIS — E11.42 TYPE 2 DIABETES MELLITUS WITH DIABETIC POLYNEUROPATHY, WITH LONG-TERM CURRENT USE OF INSULIN: ICD-10-CM

## 2025-05-16 DIAGNOSIS — Z79.4 TYPE 2 DIABETES MELLITUS WITH DIABETIC POLYNEUROPATHY, WITH LONG-TERM CURRENT USE OF INSULIN: ICD-10-CM

## 2025-05-17 RX ORDER — BLOOD-GLUCOSE METER
EACH MISCELLANEOUS
Qty: 300 STRIP | Refills: 3 | Status: SHIPPED | OUTPATIENT
Start: 2025-05-17

## 2025-06-20 DIAGNOSIS — Z79.4 TYPE 2 DIABETES MELLITUS WITH FOOT ULCER, WITH LONG-TERM CURRENT USE OF INSULIN: ICD-10-CM

## 2025-06-20 DIAGNOSIS — E11.621 TYPE 2 DIABETES MELLITUS WITH FOOT ULCER, WITH LONG-TERM CURRENT USE OF INSULIN: ICD-10-CM

## 2025-06-20 DIAGNOSIS — L97.509 TYPE 2 DIABETES MELLITUS WITH FOOT ULCER, WITH LONG-TERM CURRENT USE OF INSULIN: ICD-10-CM

## 2025-06-21 RX ORDER — GLIMEPIRIDE 4 MG/1
8 TABLET ORAL
Qty: 180 TABLET | Refills: 0 | Status: SHIPPED | OUTPATIENT
Start: 2025-06-21

## 2025-06-23 LAB
ANION GAP SERPL CALCULATED.4IONS-SCNC: 9 MMOL/L (CALC) (ref 7–17)
BUN SERPL-MCNC: 31 MG/DL (ref 7–25)
BUN/CREAT SERPL: 26 (CALC) (ref 6–22)
CALCIUM SERPL-MCNC: 8.3 MG/DL (ref 8.6–10.3)
CHLORIDE SERPL-SCNC: 105 MMOL/L (ref 98–110)
CO2 SERPL-SCNC: 25 MMOL/L (ref 20–32)
CREAT SERPL-MCNC: 1.21 MG/DL (ref 0.7–1.28)
EGFRCR SERPLBLD CKD-EPI 2021: 62 ML/MIN/1.73M2
EST. AVERAGE GLUCOSE BLD GHB EST-MCNC: 209 MG/DL
EST. AVERAGE GLUCOSE BLD GHB EST-SCNC: 11.6 MMOL/L
GLUCOSE SERPL-MCNC: 164 MG/DL (ref 65–99)
HBA1C MFR BLD: 8.9 %
POTASSIUM SERPL-SCNC: 4.1 MMOL/L (ref 3.5–5.3)
PSA FREE MFR SERPL: 35 % (CALC)
PSA FREE SERPL-MCNC: 1.3 NG/ML
PSA SERPL-MCNC: 3.7 NG/ML
SODIUM SERPL-SCNC: 139 MMOL/L (ref 135–146)

## 2025-06-24 ENCOUNTER — APPOINTMENT (OUTPATIENT)
Dept: PRIMARY CARE | Facility: CLINIC | Age: 75
End: 2025-06-24
Payer: MEDICARE

## 2025-06-24 VITALS
OXYGEN SATURATION: 92 % | DIASTOLIC BLOOD PRESSURE: 60 MMHG | SYSTOLIC BLOOD PRESSURE: 136 MMHG | HEART RATE: 78 BPM | TEMPERATURE: 98.2 F | HEIGHT: 68 IN | WEIGHT: 242.2 LBS | BODY MASS INDEX: 36.71 KG/M2

## 2025-06-24 DIAGNOSIS — L97.509 TYPE 2 DIABETES MELLITUS WITH FOOT ULCER, WITH LONG-TERM CURRENT USE OF INSULIN: ICD-10-CM

## 2025-06-24 DIAGNOSIS — E66.01 CLASS 2 SEVERE OBESITY WITH SERIOUS COMORBIDITY AND BODY MASS INDEX (BMI) OF 35.0 TO 35.9 IN ADULT, UNSPECIFIED OBESITY TYPE: ICD-10-CM

## 2025-06-24 DIAGNOSIS — Z12.5 PROSTATE CANCER SCREENING: ICD-10-CM

## 2025-06-24 DIAGNOSIS — E11.42 TYPE 2 DIABETES MELLITUS WITH DIABETIC POLYNEUROPATHY, WITH LONG-TERM CURRENT USE OF INSULIN: ICD-10-CM

## 2025-06-24 DIAGNOSIS — E11.621 TYPE 2 DIABETES MELLITUS WITH FOOT ULCER, WITH LONG-TERM CURRENT USE OF INSULIN: ICD-10-CM

## 2025-06-24 DIAGNOSIS — E66.812 CLASS 2 SEVERE OBESITY WITH SERIOUS COMORBIDITY AND BODY MASS INDEX (BMI) OF 35.0 TO 35.9 IN ADULT, UNSPECIFIED OBESITY TYPE: ICD-10-CM

## 2025-06-24 DIAGNOSIS — E55.9 VITAMIN D DEFICIENCY: ICD-10-CM

## 2025-06-24 DIAGNOSIS — E78.2 MIXED HYPERLIPIDEMIA: ICD-10-CM

## 2025-06-24 DIAGNOSIS — I10 PRIMARY HYPERTENSION: Primary | ICD-10-CM

## 2025-06-24 DIAGNOSIS — Z79.4 TYPE 2 DIABETES MELLITUS WITH DIABETIC POLYNEUROPATHY, WITH LONG-TERM CURRENT USE OF INSULIN: ICD-10-CM

## 2025-06-24 DIAGNOSIS — Z79.4 TYPE 2 DIABETES MELLITUS WITH FOOT ULCER, WITH LONG-TERM CURRENT USE OF INSULIN: ICD-10-CM

## 2025-06-24 DIAGNOSIS — R97.20 INCREASED PROSTATE SPECIFIC ANTIGEN (PSA) VELOCITY: ICD-10-CM

## 2025-06-24 DIAGNOSIS — I10 PRIMARY HYPERTENSION: ICD-10-CM

## 2025-06-24 DIAGNOSIS — Z86.73 HISTORY OF STROKE: ICD-10-CM

## 2025-06-24 PROCEDURE — 3075F SYST BP GE 130 - 139MM HG: CPT | Performed by: FAMILY MEDICINE

## 2025-06-24 PROCEDURE — 1160F RVW MEDS BY RX/DR IN RCRD: CPT | Performed by: FAMILY MEDICINE

## 2025-06-24 PROCEDURE — 1159F MED LIST DOCD IN RCRD: CPT | Performed by: FAMILY MEDICINE

## 2025-06-24 PROCEDURE — 99214 OFFICE O/P EST MOD 30 MIN: CPT | Performed by: FAMILY MEDICINE

## 2025-06-24 PROCEDURE — 3078F DIAST BP <80 MM HG: CPT | Performed by: FAMILY MEDICINE

## 2025-06-24 PROCEDURE — G2211 COMPLEX E/M VISIT ADD ON: HCPCS | Performed by: FAMILY MEDICINE

## 2025-06-24 RX ORDER — INSULIN GLARGINE 100 [IU]/ML
83 INJECTION, SOLUTION SUBCUTANEOUS DAILY
Qty: 10 EACH | Refills: 2 | Status: SHIPPED | OUTPATIENT
Start: 2025-06-24

## 2025-06-24 RX ORDER — INSULIN LISPRO 100 [IU]/ML
INJECTION, SOLUTION INTRAVENOUS; SUBCUTANEOUS
Qty: 35 EACH | Refills: 0 | Status: SHIPPED | OUTPATIENT
Start: 2025-06-24

## 2025-06-24 NOTE — PROGRESS NOTES
"Subjective   Patient ID: Marco Antonio Gill is a 74 y.o. male who presents for Follow-up.    HPI     No new concerns     Review labs: 06/20/2025-includes PSA         He has hypertension.  Pt does monitor his BP.  He is compliant with his antihypertensive therapy denies any side effects.   He denies CP, SOB, and dizziness.      Patient has hyperlipidemia.  He is treated with Atorvastatin.   Pt tries to limit the amount of fatty foods and high cholesterol foods that he consumes  Patient is compliant with his atorvastatin therapy and denies any noted side effects.      Pt has DM Type 2 and is insulin dependent.   He monitors his blood glucose and keeps a log of his readings.  Patient does try to limit the amount of carbohydrates that are consumed.  Denies any polyuria or polydipsia.     He had a stroke in 2016 and is currently taking aspirin for prophylaxis.   He was previously treated with Plavix, but discontinued it on his own in September 2016 as he felt that it made him feel \"drugged.\"  Has refused any additional antiplatelet medications.         Review of Systems  Constitutional: Patient denies any fever, chills, loss of appetite, or unexplained weight loss.  Cardiovascular: Patient denies any chest pain, shortness of breath with exertion, tachycardia, palpitations, orthopnea, or paroxysmal nocturnal dyspnea.  Respiratory: Patient denies any cough, shortness of breath, or wheezing.  Gastrointestinal: Patient denies any nausea, vomiting, diarrhea, constipation, melena, hematochezia, or reflux symptoms  Skin: Denies any rashes or skin lesions  Neurology: Patient denies any new motor or sensory losses. Denies any numbness, tingling, weakness, and incoordination of the extremities. Patient also denies any tremor, seizures, or gait instability.  Endocrinology: Denies any polyuria, polydipsia, polyphagia, or heat/cold intolerance.  Psychiatric: Patient denies any depression, anxiety or suicidal/homicidal ideation. " "    Objective   /60 (BP Location: Right arm, Patient Position: Sitting, BP Cuff Size: Adult)   Pulse 78   Temp 36.8 °C (98.2 °F) (Temporal)   Ht 1.727 m (5' 8\")   Wt 110 kg (242 lb 3.2 oz)   SpO2 92%   BMI 36.83 kg/m²     Physical Exam  General Appearance: Alert and cooperative, in no acute distress, well-developed/well-nourished obese male.    Neck: Supple and without adenopathy or rigidity. There is no JVD at 90° and no carotid bruits are noted. There is no thyromegaly, thyroid tenderness, or palpable thyroid nodules.  Heart: Regular rate and rhythm without murmur or ectopy.  Lungs: Clear to auscultation bilaterally with good air exchange.  Skin: Good turgor, moist, warm and without rashes or lesions.  Neurological exam: Alert and oriented ×3, no tremor, normal gait.  Extremities: No clubbing, cyanosis, or edema  Psychiatric: Appropriate mood and affect, good insight and judgment, no delusions or thought disorders, no suicidal or homicidal ideation    Assessment/Plan     HTN:  BP is not quite adequately controlled based on in office readings.  Declines any changes to his BP medications despite my recommendations.     Hyperlipidemia:   Stable based on last labs.  Patient will continue with the current dose of Atorvastatin.   Dietary changes, exercise, and maintenance of healthy weight were discussed at length.        Diabetes mellitus type 2 with neuropathy:   Most recent A1c was:   Lab Results   Component Value Date    HGBA1C 8.9 (H) 06/20/2025 7/11/2022: We increased Lantus from 58 to 62 units daily. We increased Humalog from 21 to 23 units twice daily with meals.  10/11/22: We increased the dosing of his Humalog to 25 units twice a day with meals.  1/10/2023: Recommended continuous glucose monitor to pt. He stated he will consider this at his next visit in April.  Pt was advised to add 15 units of Humalog with his lunch.  5/2/2023: We will change the Humalog to 25/15/25 Units with meals. Continue " "Lantus at 70 Units once daily.  8/1/2023: Pt has once again declined continuous glucose monitor and will consider at next visit in November. Increased Lantus from 70 units to 75 units QD. Increased Humalog from 25/15/25 units to 27/27/27 units with meals.  Pt previously stopped metformin and refused to restart it as he believes it is \"not good\" for him.  Dietary changes, exercise, and maintenance of a healthy weight were discussed at length.  Patient was advised to have a diabetic eye exam annually.  Patient was also advised to check the feet on a daily basis.  10/31/2023:  Changed Humalog to 27/28/27 units with meals.  1/31/2024: Will change Humalog to 32/29/32 units with meals   6/5/2024:  A1c remains elevated. Will change Humalog to 34/31/34 units with meals. Increased Lantus from 75 units to 80 units once a day.  9/5/2024: We will change the Humalog to 36/33/36 units with meals. Increased the Lantus from 80 units to 83 units.   12/17/2024:  A1c is unchanged at 8.2%. We will change the Humalog to 40/33/40 units with meals.   3/17/2025: A1c has further increased to 8.9%.  I have recommended changes to his diabetes medications and referral to endocrinology to get opinion on management of his DM.  Pt unfortunately declines both and prefers to work on diet changes.  Risks of uncontrolled DM discussed including the complications of vascular disease, retinopathy, neuropathy, and nephropathy.  6/24/2025: A1c remains elevated at 8.9% (unchanged). I recommended a referral to Endocrinology and changes to his diabetes medications. Patient has declined and would instead like to to work on diet and lifestyle modification.  Will discuss again at his next office visit in 3 months.      Hx of stroke:   He will continue on daily aspirin therapy.   He discontinued Plavix in 2016 as it made him feel \"drugged\" and he wishes to remain on ASA only.  He has refused any additional anticoagulation (beyond ASA) despite my advice and " encouragement.     Vitamin D deficiency:   Vit D was normal on last labs.   We will continue the current Vit D supplementation.  Lab Results  Component Value Date    VITD25 49 03/17/2025          Obesity:   Dietary changes, exercise, and maintenance of a healthy weight were discussed at length.  Goal is to achieve a BMI less than 25.     Increased PSA:  His PSA increased from 2.54 to 3.55 in one year.   3/17/2025 PSA increased further to 4.2.  I recommended we refer to urology for the continued rise of the PSA to evaluate for prostate cancer.  He has declined and instead has requested to repeat testing in another 3 months.  6/20/2025 PSA has decreased to 3.7.  PSA trend:  2.54, 3.55, 4.2, 3.7        Prostate cancer screening:  Ordered annual PSA screening.         MCAW DUE 3/2026  Refuses colon cancer screening.  PSA screen DUE 9/4/2025         Scribe Attestation  By signing my name below, I, Ann Meyers   attest that this documentation has been prepared under the direction and in the presence of Rm Minaya DO.    Orders Placed This Encounter   Procedures    Prostate Specific Antigen, Screen    Hemoglobin A1C    Albumin-Creatinine Ratio, Urine Random    Comprehensive Metabolic Panel    Lipid Panel    TSH with reflex to Free T4 if abnormal     Requested Prescriptions     Signed Prescriptions Disp Refills    insulin lispro (HumaLOG) 100 unit/mL pen 35 each 0     Sig: Inject 40 units under the skin before breakfast, 33 units before lunch, and 40 units before evening meal.    insulin glargine (Lantus Solostar U-100 Insulin) 100 unit/mL (3 mL) pen 10 each 2     Sig: Inject 83 Units under the skin once daily.

## 2025-06-24 NOTE — PATIENT INSTRUCTIONS
Follow up in 3 months with labs to be done PRIOR.    You have declined referral to an endocrinologist as I have recommended.      It was a pleasure to see you today. Thank you for choosing us for your health care needs.    If you have lab or other testing completed and have not been informed of results within one week, please call the office for your results.    If you haven't done so, consider signing up for [facility] [employer], the [facility] personal health record. Ask the staff how you can get started.

## 2025-08-08 DIAGNOSIS — E78.2 MIXED HYPERLIPIDEMIA: ICD-10-CM

## 2025-08-09 DIAGNOSIS — I10 PRIMARY HYPERTENSION: ICD-10-CM

## 2025-08-09 RX ORDER — ATORVASTATIN CALCIUM 20 MG/1
20 TABLET, FILM COATED ORAL DAILY
Qty: 90 TABLET | Refills: 0 | Status: SHIPPED | OUTPATIENT
Start: 2025-08-09

## 2025-08-11 RX ORDER — LISINOPRIL AND HYDROCHLOROTHIAZIDE 12.5; 2 MG/1; MG/1
2 TABLET ORAL DAILY
Qty: 180 TABLET | Refills: 0 | OUTPATIENT
Start: 2025-08-11

## 2025-09-23 ENCOUNTER — APPOINTMENT (OUTPATIENT)
Dept: PRIMARY CARE | Facility: CLINIC | Age: 75
End: 2025-09-23
Payer: MEDICARE